# Patient Record
Sex: FEMALE | URBAN - METROPOLITAN AREA
[De-identification: names, ages, dates, MRNs, and addresses within clinical notes are randomized per-mention and may not be internally consistent; named-entity substitution may affect disease eponyms.]

---

## 2020-07-31 ENCOUNTER — APPOINTMENT (OUTPATIENT)
Age: 75
Setting detail: DERMATOLOGY
End: 2020-07-31

## 2020-07-31 DIAGNOSIS — Z85.828 PERSONAL HISTORY OF OTHER MALIGNANT NEOPLASM OF SKIN: ICD-10-CM

## 2020-07-31 PROBLEM — D48.5 NEOPLASM OF UNCERTAIN BEHAVIOR OF SKIN: Status: ACTIVE | Noted: 2020-07-31

## 2020-07-31 PROCEDURE — OTHER REASSURANCE: OTHER

## 2020-07-31 PROCEDURE — 88305 TISSUE EXAM BY PATHOLOGIST: CPT

## 2020-07-31 PROCEDURE — 11102 TANGNTL BX SKIN SINGLE LES: CPT

## 2020-07-31 PROCEDURE — OTHER PATHOLOGY BILLING: OTHER

## 2020-07-31 PROCEDURE — 99213 OFFICE O/P EST LOW 20 MIN: CPT | Mod: 25

## 2020-07-31 PROCEDURE — OTHER BIOPSY BY SHAVE METHOD: OTHER

## 2020-07-31 NOTE — PROCEDURE: BIOPSY BY SHAVE METHOD
Biopsy Type: H and E
Additional Anesthesia Volume In Cc (Will Not Render If 0): 0
Silver Nitrate Text: The wound bed was treated with silver nitrate after the biopsy was performed.
Curettage Text: The wound bed was treated with curettage after the biopsy was performed.
Consent: Written consent was obtained and risks were reviewed including but not limited to scarring, infection, bleeding, scabbing, incomplete removal, nerve damage and allergy to anesthesia.
Billing Type: Client Bill
Electrodesiccation Text: The wound bed was treated with electrodesiccation after the biopsy was performed.
Validate Lesion Size: No
Post-Care Instructions: I reviewed with the patient in detail post-care instructions. Patient is to keep the biopsy site dry overnight, and then apply bacitracin twice daily until healed. Patient may apply hydrogen peroxide soaks to remove any crusting.
Notification Instructions: Patient will be notified of biopsy results. However, patient instructed to call the office if not contacted within 2 weeks.
Cryotherapy Text: The wound bed was treated with cryotherapy after the biopsy was performed.
Hemostasis: Drysol
Type Of Destruction Used: Curettage
Biopsy Method: Dermablade
Wound Care: Petrolatum
Depth Of Biopsy: dermis
Dressing: bandage
Electrodesiccation And Curettage Text: The wound bed was treated with electrodesiccation and curettage after the biopsy was performed.
Detail Level: Detailed
Anesthesia Type: 2% lidocaine with epinephrine and a 1:10 solution of 8.4% sodium bicarbonate
Render Post-Care Instructions In Note?: yes
Information: Selecting Yes will display possible errors in your note based on the variables you have selected. This validation is only offered as a suggestion for you. PLEASE NOTE THAT THE VALIDATION TEXT WILL BE REMOVED WHEN YOU FINALIZE YOUR NOTE. IF YOU WANT TO FAX A PRELIMINARY NOTE YOU WILL NEED TO TOGGLE THIS TO 'NO' IF YOU DO NOT WANT IT IN YOUR FAXED NOTE.
Anesthesia Volume In Cc (Will Not Render If 0): 0.4

## 2020-07-31 NOTE — PROCEDURE: PATHOLOGY BILLING
Immunohistochemistry ([id number] and [id number]) billing is not performed here. Please use the Immunohistochemistry Stain Billing plan to accomplish this. Immunohistochemistry ([id number] and [id number]) billing is not performed here. Please use the Immunohistochemistry Stain Billing plan to accomplish this.

## 2020-09-01 ENCOUNTER — APPOINTMENT (OUTPATIENT)
Dept: URBAN - METROPOLITAN AREA CLINIC 255 | Age: 75
Setting detail: DERMATOLOGY
End: 2020-09-09

## 2020-09-01 PROBLEM — C44.311 BASAL CELL CARCINOMA OF SKIN OF NOSE: Status: ACTIVE | Noted: 2020-09-01

## 2020-09-01 PROCEDURE — 17312 MOHS ADDL STAGE: CPT

## 2020-09-01 PROCEDURE — 17311 MOHS 1 STAGE H/N/HF/G: CPT

## 2020-09-01 PROCEDURE — OTHER MOHS SURGERY: OTHER

## 2020-09-01 PROCEDURE — OTHER MIPS QUALITY: OTHER

## 2020-09-01 PROCEDURE — 14061 TIS TRNFR E/N/E/L10.1-30SQCM: CPT

## 2020-09-01 NOTE — PROCEDURE: MIPS QUALITY
Detail Level: Detailed
Quality 110: Preventive Care And Screening: Influenza Immunization: Influenza Immunization previously received during influenza season
Quality 226: Preventive Care And Screening: Tobacco Use: Screening And Cessation Intervention: Patient screened for tobacco use and is an ex/non-smoker
Quality 143: Oncology: Medical And Radiation- Pain Intensity Quantified: Pain severity quantified, no pain present
Quality 130: Documentation Of Current Medications In The Medical Record: Current Medications Documented
Quality 431: Preventive Care And Screening: Unhealthy Alcohol Use - Screening: Patient screened for unhealthy alcohol use using a single question and scores less than 2 times per year

## 2020-09-01 NOTE — PROCEDURE: MOHS SURGERY
Advancement Flap (Double) Text: In order to preserve normal anatomic and functional relationships, a single advancement flap was deemed the most appropriate reconstructive procedure.  The beveled edges of the Mohs defect were excised with a #15 scalpel blade.    The flaps were designed to fall along relaxed skin tension lines and/or free margins, incised, and elevated using blunt curved tissue scissors.  Hemostasis was achieved.  Interrupted buried vertical mattress sutures were employed to advance the flaps into the primary defect and secure them in place.  Redundant cutaneous columns defined by flap movement were removed to the adipose layer using the triangulation technique and repaired in similar fashion.  Final epidermal approximation along the length of the flap was achieved using epidermal sutures.  The wound was stressed in various directions to ensure the adequacy of the closure and of hemostasis.  The flap edges appeared pink and well perfused.  Reconstruction was considered complete.

## 2020-09-01 NOTE — PROCEDURE: MOHS SURGERY
Body Location Override (Optional - Billing Will Still Be Based On Selected Body Map Location If Applicable): Left nasal Sidewall

## 2020-09-08 ENCOUNTER — APPOINTMENT (OUTPATIENT)
Dept: URBAN - METROPOLITAN AREA CLINIC 255 | Age: 75
Setting detail: DERMATOLOGY
End: 2020-09-09

## 2020-09-08 DIAGNOSIS — Z48.02 ENCOUNTER FOR REMOVAL OF SUTURES: ICD-10-CM

## 2020-09-08 PROCEDURE — OTHER RETURN TO REFERRING PROVIDER: OTHER

## 2020-09-08 PROCEDURE — OTHER COUNSELING: OTHER

## 2020-09-08 PROCEDURE — OTHER SUTURE REMOVAL (GLOBAL PERIOD): OTHER

## 2020-09-08 PROCEDURE — OTHER DIAGNOSIS COMMENT: OTHER

## 2020-09-08 ASSESSMENT — LOCATION DETAILED DESCRIPTION DERM: LOCATION DETAILED: NASAL DORSUM

## 2020-09-08 ASSESSMENT — LOCATION ZONE DERM: LOCATION ZONE: NOSE

## 2020-09-08 ASSESSMENT — LOCATION SIMPLE DESCRIPTION DERM: LOCATION SIMPLE: NOSE

## 2020-09-08 NOTE — PROCEDURE: SUTURE REMOVAL (GLOBAL PERIOD)
Detail Level: Detailed
Add 16492 Cpt? (Important Note: In 2017 The Use Of 47407 Is Being Tracked By Cms To Determine Future Global Period Reimbursement For Global Periods): no

## 2021-04-21 ENCOUNTER — APPOINTMENT (OUTPATIENT)
Dept: URBAN - METROPOLITAN AREA CLINIC 253 | Age: 76
Setting detail: DERMATOLOGY
End: 2021-04-23

## 2021-04-21 VITALS — RESPIRATION RATE: 15 BRPM | WEIGHT: 148 LBS | HEIGHT: 64 IN

## 2021-04-21 DIAGNOSIS — D18.0 HEMANGIOMA: ICD-10-CM

## 2021-04-21 DIAGNOSIS — H00.01 HORDEOLUM EXTERNUM: ICD-10-CM

## 2021-04-21 DIAGNOSIS — L81.4 OTHER MELANIN HYPERPIGMENTATION: ICD-10-CM

## 2021-04-21 DIAGNOSIS — D22 MELANOCYTIC NEVI: ICD-10-CM

## 2021-04-21 DIAGNOSIS — L57.0 ACTINIC KERATOSIS: ICD-10-CM

## 2021-04-21 DIAGNOSIS — Z71.89 OTHER SPECIFIED COUNSELING: ICD-10-CM

## 2021-04-21 DIAGNOSIS — L82.1 OTHER SEBORRHEIC KERATOSIS: ICD-10-CM

## 2021-04-21 DIAGNOSIS — L82.0 INFLAMED SEBORRHEIC KERATOSIS: ICD-10-CM

## 2021-04-21 PROBLEM — D18.01 HEMANGIOMA OF SKIN AND SUBCUTANEOUS TISSUE: Status: ACTIVE | Noted: 2021-04-21

## 2021-04-21 PROBLEM — D22.5 MELANOCYTIC NEVI OF TRUNK: Status: ACTIVE | Noted: 2021-04-21

## 2021-04-21 PROBLEM — H00.012 HORDEOLUM EXTERNUM RIGHT LOWER EYELID: Status: ACTIVE | Noted: 2021-04-21

## 2021-04-21 PROBLEM — D48.5 NEOPLASM OF UNCERTAIN BEHAVIOR OF SKIN: Status: ACTIVE | Noted: 2021-04-21

## 2021-04-21 PROCEDURE — OTHER BIOPSY BY SHAVE METHOD: OTHER

## 2021-04-21 PROCEDURE — OTHER LIQUID NITROGEN: OTHER

## 2021-04-21 PROCEDURE — OTHER COUNSELING: OTHER

## 2021-04-21 PROCEDURE — 99213 OFFICE O/P EST LOW 20 MIN: CPT | Mod: 25

## 2021-04-21 PROCEDURE — 17110 DESTRUCT B9 LESION 1-14: CPT

## 2021-04-21 PROCEDURE — 11102 TANGNTL BX SKIN SINGLE LES: CPT | Mod: 59

## 2021-04-21 PROCEDURE — 17003 DESTRUCT PREMALG LES 2-14: CPT | Mod: 59

## 2021-04-21 PROCEDURE — 17000 DESTRUCT PREMALG LESION: CPT | Mod: 59

## 2021-04-21 PROCEDURE — 11103 TANGNTL BX SKIN EA SEP/ADDL: CPT | Mod: 59

## 2021-04-21 ASSESSMENT — LOCATION SIMPLE DESCRIPTION DERM
LOCATION SIMPLE: RIGHT CHEEK
LOCATION SIMPLE: RIGHT INFERIOR EYELID
LOCATION SIMPLE: LEFT TEMPLE
LOCATION SIMPLE: UPPER BACK

## 2021-04-21 ASSESSMENT — LOCATION ZONE DERM
LOCATION ZONE: TRUNK
LOCATION ZONE: FACE
LOCATION ZONE: EYELID

## 2021-04-21 ASSESSMENT — LOCATION DETAILED DESCRIPTION DERM
LOCATION DETAILED: INFERIOR THORACIC SPINE
LOCATION DETAILED: RIGHT INFERIOR CENTRAL MALAR CHEEK
LOCATION DETAILED: LEFT CENTRAL TEMPLE
LOCATION DETAILED: RIGHT LATERAL INFERIOR EYELID

## 2021-04-21 NOTE — PROCEDURE: LIQUID NITROGEN
Number Of Freeze-Thaw Cycles: 1 freeze-thaw cycle
Detail Level: Detailed
Render Note In Bullet Format When Appropriate: No
Number Of Freeze-Thaw Cycles: 2 freeze-thaw cycles
Post-Care Instructions: I reviewed with the patient in detail post-care instructions. Patient is to wear sunprotection, and avoid picking at any of the treated lesions. Pt may apply Vaseline to crusted or scabbing areas.
Medical Necessity Clause: This procedure was medically necessary because the lesions that were treated were:
Duration Of Freeze Thaw-Cycle (Seconds): 3
Render Post-Care Instructions In Note?: yes
Consent: The patient's consent was obtained including but not limited to risks of crusting, scabbing, blistering, scarring, darker or lighter pigmentary change, recurrence, incomplete removal and infection.
Medical Necessity Information: It is in your best interest to select a reason for this procedure from the list below. All of these items fulfill various CMS LCD requirements except the new and changing color options.

## 2021-04-21 NOTE — HPI: FULL BODY SKIN EXAMINATION
How Severe Are Your Spot(S)?: moderate
What Is The Reason For Today's Visit?: Full Body Skin Examination
What Is The Reason For Today's Visit? (Being Monitored For X): the re-examination of lesions previously examined
Additional History: The spot on her right shin is pink and not changing.  There are a few scaly spots on her right cheek as well.

## 2021-04-21 NOTE — PROCEDURE: BIOPSY BY SHAVE METHOD
Bill 18383 For Specimen Handling/Conveyance To Laboratory?: no
X Size Of Lesion In Cm: 0
Billing Type: Third-Party Bill
Detail Level: Detailed
Cryotherapy Text: The wound bed was treated with cryotherapy after the biopsy was performed.
Type Of Destruction Used: Curettage
Biopsy Type: H and E
Information: Selecting Yes will display possible errors in your note based on the variables you have selected. This validation is only offered as a suggestion for you. PLEASE NOTE THAT THE VALIDATION TEXT WILL BE REMOVED WHEN YOU FINALIZE YOUR NOTE. IF YOU WANT TO FAX A PRELIMINARY NOTE YOU WILL NEED TO TOGGLE THIS TO 'NO' IF YOU DO NOT WANT IT IN YOUR FAXED NOTE.
Post-Care Instructions: I reviewed with the patient in detail post-care instructions. Patient is to keep the biopsy site dry overnight, and then apply bacitracin twice daily until healed. Patient may apply hydrogen peroxide soaks to remove any crusting.
Curettage Text: The wound bed was treated with curettage after the biopsy was performed.
Dressing: bandage
Depth Of Biopsy: dermis
Consent: Written consent was obtained and risks were reviewed including but not limited to scarring, infection, bleeding, scabbing, incomplete removal, nerve damage and allergy to anesthesia.
Anesthesia Type: 2% lidocaine with epinephrine and a 1:10 solution of 8.4% sodium bicarbonate
Electrodesiccation Text: The wound bed was treated with electrodesiccation after the biopsy was performed.
Biopsy Method: Dermablade
Wound Care: Petrolatum
Was A Bandage Applied: Yes
Anesthesia Volume In Cc (Will Not Render If 0): 0.3
Notification Instructions: Patient will be notified of biopsy results. However, patient instructed to call the office if not contacted within 2 weeks.
Electrodesiccation And Curettage Text: The wound bed was treated with electrodesiccation and curettage after the biopsy was performed.
Silver Nitrate Text: The wound bed was treated with silver nitrate after the biopsy was performed.
Hemostasis: Drysol

## 2021-10-25 ENCOUNTER — APPOINTMENT (OUTPATIENT)
Dept: URBAN - METROPOLITAN AREA CLINIC 253 | Age: 76
Setting detail: DERMATOLOGY
End: 2021-10-29

## 2021-10-25 VITALS — HEIGHT: 64 IN | WEIGHT: 140 LBS | RESPIRATION RATE: 14 BRPM

## 2021-10-25 DIAGNOSIS — Z85.828 PERSONAL HISTORY OF OTHER MALIGNANT NEOPLASM OF SKIN: ICD-10-CM

## 2021-10-25 DIAGNOSIS — L57.0 ACTINIC KERATOSIS: ICD-10-CM

## 2021-10-25 DIAGNOSIS — D22 MELANOCYTIC NEVI: ICD-10-CM

## 2021-10-25 DIAGNOSIS — Z71.89 OTHER SPECIFIED COUNSELING: ICD-10-CM

## 2021-10-25 DIAGNOSIS — D18.0 HEMANGIOMA: ICD-10-CM

## 2021-10-25 DIAGNOSIS — L81.4 OTHER MELANIN HYPERPIGMENTATION: ICD-10-CM

## 2021-10-25 DIAGNOSIS — L82.1 OTHER SEBORRHEIC KERATOSIS: ICD-10-CM

## 2021-10-25 PROBLEM — D48.5 NEOPLASM OF UNCERTAIN BEHAVIOR OF SKIN: Status: ACTIVE | Noted: 2021-10-25

## 2021-10-25 PROBLEM — D18.01 HEMANGIOMA OF SKIN AND SUBCUTANEOUS TISSUE: Status: ACTIVE | Noted: 2021-10-25

## 2021-10-25 PROBLEM — D22.5 MELANOCYTIC NEVI OF TRUNK: Status: ACTIVE | Noted: 2021-10-25

## 2021-10-25 PROCEDURE — 99213 OFFICE O/P EST LOW 20 MIN: CPT | Mod: 25

## 2021-10-25 PROCEDURE — OTHER BIOPSY BY SHAVE METHOD: OTHER

## 2021-10-25 PROCEDURE — 17000 DESTRUCT PREMALG LESION: CPT | Mod: 59

## 2021-10-25 PROCEDURE — 11102 TANGNTL BX SKIN SINGLE LES: CPT

## 2021-10-25 PROCEDURE — OTHER COUNSELING: OTHER

## 2021-10-25 PROCEDURE — OTHER LIQUID NITROGEN: OTHER

## 2021-10-25 PROCEDURE — 11103 TANGNTL BX SKIN EA SEP/ADDL: CPT

## 2021-10-25 PROCEDURE — 17003 DESTRUCT PREMALG LES 2-14: CPT

## 2021-10-25 ASSESSMENT — LOCATION SIMPLE DESCRIPTION DERM
LOCATION SIMPLE: POSTERIOR NECK
LOCATION SIMPLE: CHEST
LOCATION SIMPLE: RIGHT PRETIBIAL REGION
LOCATION SIMPLE: LEFT PRETIBIAL REGION
LOCATION SIMPLE: UPPER BACK
LOCATION SIMPLE: LEFT NOSE

## 2021-10-25 ASSESSMENT — LOCATION ZONE DERM
LOCATION ZONE: TRUNK
LOCATION ZONE: LEG
LOCATION ZONE: NECK
LOCATION ZONE: NOSE

## 2021-10-25 ASSESSMENT — LOCATION DETAILED DESCRIPTION DERM
LOCATION DETAILED: LEFT DISTAL PRETIBIAL REGION
LOCATION DETAILED: RIGHT DISTAL PRETIBIAL REGION
LOCATION DETAILED: MIDDLE STERNUM
LOCATION DETAILED: INFERIOR THORACIC SPINE
LOCATION DETAILED: LEFT LATERAL TRAPEZIAL NECK
LOCATION DETAILED: LEFT NASAL SIDEWALL

## 2021-10-25 NOTE — PROCEDURE: BIOPSY BY SHAVE METHOD
Hide Topical Anesthesia?: No
Type Of Destruction Used: Curettage
Size Of Lesion In Cm: 0
Hemostasis: Drysol
Cryotherapy Text: The wound bed was treated with cryotherapy after the biopsy was performed.
Post-Care Instructions: I reviewed with the patient in detail post-care instructions. Patient is to keep the biopsy site dry overnight, and then apply bacitracin twice daily until healed. Patient may apply hydrogen peroxide soaks to remove any crusting.
Biopsy Type: H and E
Electrodesiccation Text: The wound bed was treated with electrodesiccation after the biopsy was performed.
Render Post-Care Instructions In Note?: yes
Electrodesiccation And Curettage Text: The wound bed was treated with electrodesiccation and curettage after the biopsy was performed.
Notification Instructions: Patient will be notified of biopsy results. However, patient instructed to call the office if not contacted within 2 weeks.
Anesthesia Volume In Cc (Will Not Render If 0): 0.3
Information: Selecting Yes will display possible errors in your note based on the variables you have selected. This validation is only offered as a suggestion for you. PLEASE NOTE THAT THE VALIDATION TEXT WILL BE REMOVED WHEN YOU FINALIZE YOUR NOTE. IF YOU WANT TO FAX A PRELIMINARY NOTE YOU WILL NEED TO TOGGLE THIS TO 'NO' IF YOU DO NOT WANT IT IN YOUR FAXED NOTE.
Depth Of Biopsy: dermis
Anesthesia Type: 2% lidocaine with epinephrine and a 1:10 solution of 8.4% sodium bicarbonate
Biopsy Method: Dermablade
Detail Level: Detailed
Curettage Text: The wound bed was treated with curettage after the biopsy was performed.
Dressing: bandage
Billing Type: Third-Party Bill
Wound Care: Petrolatum
Consent: Written consent was obtained and risks were reviewed including but not limited to scarring, infection, bleeding, scabbing, incomplete removal, nerve damage and allergy to anesthesia.
Silver Nitrate Text: The wound bed was treated with silver nitrate after the biopsy was performed.

## 2021-10-25 NOTE — PROCEDURE: LIQUID NITROGEN
Detail Level: Detailed
Render Note In Bullet Format When Appropriate: No
Render Post-Care Instructions In Note?: yes
Consent: The patient's consent was obtained including but not limited to risks of crusting, scabbing, blistering, scarring, darker or lighter pigmentary change, recurrence, incomplete removal and infection.
Post-Care Instructions: I reviewed with the patient in detail post-care instructions. Patient is to wear sunprotection, and avoid picking at any of the treated lesions. Pt may apply Vaseline to crusted or scabbing areas.
Number Of Freeze-Thaw Cycles: 2 freeze-thaw cycles
Duration Of Freeze Thaw-Cycle (Seconds): 3

## 2021-11-22 ENCOUNTER — APPOINTMENT (OUTPATIENT)
Dept: URBAN - METROPOLITAN AREA CLINIC 253 | Age: 76
Setting detail: DERMATOLOGY
End: 2021-11-24

## 2021-11-22 VITALS — HEIGHT: 64 IN | WEIGHT: 140 LBS | RESPIRATION RATE: 14 BRPM

## 2021-11-22 VITALS — WEIGHT: 140 LBS | HEIGHT: 64 IN

## 2021-11-22 DIAGNOSIS — D22 MELANOCYTIC NEVI: ICD-10-CM

## 2021-11-22 DIAGNOSIS — L57.0 ACTINIC KERATOSIS: ICD-10-CM

## 2021-11-22 PROBLEM — D22.61 MELANOCYTIC NEVI OF RIGHT UPPER LIMB, INCLUDING SHOULDER: Status: ACTIVE | Noted: 2021-11-22

## 2021-11-22 PROCEDURE — OTHER EXCISION: OTHER

## 2021-11-22 PROCEDURE — 11402 EXC TR-EXT B9+MARG 1.1-2 CM: CPT

## 2021-11-22 PROCEDURE — 12031 INTMD RPR S/A/T/EXT 2.5 CM/<: CPT

## 2021-11-22 PROCEDURE — OTHER MIPS QUALITY: OTHER

## 2021-11-22 PROCEDURE — OTHER COUNSELING: OTHER

## 2021-11-22 ASSESSMENT — LOCATION SIMPLE DESCRIPTION DERM
LOCATION SIMPLE: RIGHT LIP
LOCATION SIMPLE: RIGHT FOREARM

## 2021-11-22 ASSESSMENT — LOCATION ZONE DERM
LOCATION ZONE: ARM
LOCATION ZONE: LIP

## 2021-11-22 ASSESSMENT — LOCATION DETAILED DESCRIPTION DERM
LOCATION DETAILED: RIGHT PROXIMAL DORSAL FOREARM
LOCATION DETAILED: RIGHT UPPER CUTANEOUS LIP

## 2021-11-22 NOTE — PROCEDURE: COUNSELING
Patient Specific Counseling (Will Not Stick From Patient To Patient): Informed her that it appears resolved on exam. Continue to monitor.
Detail Level: Simple

## 2021-12-06 ENCOUNTER — APPOINTMENT (OUTPATIENT)
Dept: URBAN - METROPOLITAN AREA CLINIC 253 | Age: 76
Setting detail: DERMATOLOGY
End: 2021-12-09

## 2021-12-06 DIAGNOSIS — L57.0 ACTINIC KERATOSIS: ICD-10-CM

## 2021-12-06 DIAGNOSIS — Z48.02 ENCOUNTER FOR REMOVAL OF SUTURES: ICD-10-CM

## 2021-12-06 PROBLEM — D04.71 CARCINOMA IN SITU OF SKIN OF RIGHT LOWER LIMB, INCLUDING HIP: Status: ACTIVE | Noted: 2021-12-06

## 2021-12-06 PROCEDURE — 99213 OFFICE O/P EST LOW 20 MIN: CPT

## 2021-12-06 PROCEDURE — OTHER ADDITIONAL NOTES: OTHER

## 2021-12-06 PROCEDURE — OTHER COUNSELING: OTHER

## 2021-12-06 PROCEDURE — OTHER SUTURE REMOVAL (GLOBAL PERIOD): OTHER

## 2021-12-06 PROCEDURE — OTHER PRESCRIPTION: OTHER

## 2021-12-06 RX ORDER — FLUOROURACIL 2 G/40G
5% CREAM TOPICAL BID
Qty: 40 | Refills: 0 | Status: ERX | COMMUNITY
Start: 2021-12-06

## 2021-12-06 ASSESSMENT — LOCATION DETAILED DESCRIPTION DERM
LOCATION DETAILED: LEFT SUPERIOR VERMILION LIP
LOCATION DETAILED: RIGHT PROXIMAL RADIAL DORSAL FOREARM

## 2021-12-06 ASSESSMENT — LOCATION SIMPLE DESCRIPTION DERM
LOCATION SIMPLE: LEFT LIP
LOCATION SIMPLE: RIGHT FOREARM

## 2021-12-06 ASSESSMENT — LOCATION ZONE DERM
LOCATION ZONE: LIP
LOCATION ZONE: ARM

## 2021-12-06 NOTE — PROCEDURE: ADDITIONAL NOTES
Render Risk Assessment In Note?: no
Detail Level: Zone
Additional Notes: No recurrence. Will continue to monitor at skin exams

## 2021-12-06 NOTE — PROCEDURE: SUTURE REMOVAL (GLOBAL PERIOD)
Detail Level: Detailed
Add 01600 Cpt? (Important Note: In 2017 The Use Of 04485 Is Being Tracked By Cms To Determine Future Global Period Reimbursement For Global Periods): no

## 2022-04-17 ENCOUNTER — APPOINTMENT (OUTPATIENT)
Dept: MRI IMAGING | Facility: CLINIC | Age: 77
DRG: 066 | End: 2022-04-17
Attending: EMERGENCY MEDICINE
Payer: COMMERCIAL

## 2022-04-17 ENCOUNTER — APPOINTMENT (OUTPATIENT)
Dept: CT IMAGING | Facility: CLINIC | Age: 77
DRG: 066 | End: 2022-04-17
Attending: EMERGENCY MEDICINE
Payer: COMMERCIAL

## 2022-04-17 ENCOUNTER — HOSPITAL ENCOUNTER (INPATIENT)
Facility: CLINIC | Age: 77
LOS: 2 days | Discharge: HOME | DRG: 066 | End: 2022-04-19
Attending: EMERGENCY MEDICINE | Admitting: HOSPITALIST
Payer: COMMERCIAL

## 2022-04-17 DIAGNOSIS — I63.49 CEREBROVASCULAR ACCIDENT (CVA) DUE TO EMBOLISM OF OTHER CEREBRAL ARTERY (H): ICD-10-CM

## 2022-04-17 LAB
ABO/RH(D): NORMAL
ALBUMIN SERPL-MCNC: 4.1 G/DL (ref 3.4–5)
ALP SERPL-CCNC: 84 U/L (ref 40–150)
ALT SERPL W P-5'-P-CCNC: 36 U/L (ref 0–50)
ANION GAP SERPL CALCULATED.3IONS-SCNC: 3 MMOL/L (ref 3–14)
ANTIBODY SCREEN: NEGATIVE
APTT PPP: 28 SECONDS (ref 22–38)
AST SERPL W P-5'-P-CCNC: 25 U/L (ref 0–45)
ATRIAL RATE - MUSE: 83 BPM
BASOPHILS # BLD AUTO: 0 10E3/UL (ref 0–0.2)
BASOPHILS NFR BLD AUTO: 1 %
BILIRUB DIRECT SERPL-MCNC: <0.1 MG/DL (ref 0–0.2)
BILIRUB SERPL-MCNC: 0.2 MG/DL (ref 0.2–1.3)
BUN SERPL-MCNC: 15 MG/DL (ref 7–30)
CALCIUM SERPL-MCNC: 9.3 MG/DL (ref 8.5–10.1)
CHLORIDE BLD-SCNC: 112 MMOL/L (ref 94–109)
CHOLEST SERPL-MCNC: 243 MG/DL
CO2 SERPL-SCNC: 26 MMOL/L (ref 20–32)
CREAT SERPL-MCNC: 0.7 MG/DL (ref 0.52–1.04)
DIASTOLIC BLOOD PRESSURE - MUSE: NORMAL MMHG
EOSINOPHIL # BLD AUTO: 0.2 10E3/UL (ref 0–0.7)
EOSINOPHIL NFR BLD AUTO: 2 %
ERYTHROCYTE [DISTWIDTH] IN BLOOD BY AUTOMATED COUNT: 12.3 % (ref 10–15)
GFR SERPL CREATININE-BSD FRML MDRD: 89 ML/MIN/1.73M2
GLUCOSE BLD-MCNC: 119 MG/DL (ref 70–99)
HBA1C MFR BLD: 5.6 % (ref 0–5.6)
HCT VFR BLD AUTO: 41.7 % (ref 35–47)
HDLC SERPL-MCNC: 55 MG/DL
HGB BLD-MCNC: 13.6 G/DL (ref 11.7–15.7)
IMM GRANULOCYTES # BLD: 0 10E3/UL
IMM GRANULOCYTES NFR BLD: 0 %
INR PPP: 0.96 (ref 0.85–1.15)
INTERPRETATION ECG - MUSE: NORMAL
LDLC SERPL CALC-MCNC: 147 MG/DL
LYMPHOCYTES # BLD AUTO: 1.8 10E3/UL (ref 0.8–5.3)
LYMPHOCYTES NFR BLD AUTO: 27 %
MCH RBC QN AUTO: 30 PG (ref 26.5–33)
MCHC RBC AUTO-ENTMCNC: 32.6 G/DL (ref 31.5–36.5)
MCV RBC AUTO: 92 FL (ref 78–100)
MONOCYTES # BLD AUTO: 0.6 10E3/UL (ref 0–1.3)
MONOCYTES NFR BLD AUTO: 8 %
NEUTROPHILS # BLD AUTO: 4.3 10E3/UL (ref 1.6–8.3)
NEUTROPHILS NFR BLD AUTO: 62 %
NONHDLC SERPL-MCNC: 188 MG/DL
NRBC # BLD AUTO: 0 10E3/UL
NRBC BLD AUTO-RTO: 0 /100
P AXIS - MUSE: 43 DEGREES
PLATELET # BLD AUTO: 191 10E3/UL (ref 150–450)
POTASSIUM BLD-SCNC: 3.8 MMOL/L (ref 3.4–5.3)
PR INTERVAL - MUSE: 140 MS
PROT SERPL-MCNC: 7.5 G/DL (ref 6.8–8.8)
QRS DURATION - MUSE: 96 MS
QT - MUSE: 386 MS
QTC - MUSE: 453 MS
R AXIS - MUSE: 54 DEGREES
RBC # BLD AUTO: 4.53 10E6/UL (ref 3.8–5.2)
SARS-COV-2 RNA RESP QL NAA+PROBE: NEGATIVE
SODIUM SERPL-SCNC: 141 MMOL/L (ref 133–144)
SPECIMEN EXPIRATION DATE: NORMAL
SYSTOLIC BLOOD PRESSURE - MUSE: NORMAL MMHG
T AXIS - MUSE: 59 DEGREES
TRIGL SERPL-MCNC: 207 MG/DL
TROPONIN I SERPL HS-MCNC: <3 NG/L
VENTRICULAR RATE- MUSE: 83 BPM
WBC # BLD AUTO: 7 10E3/UL (ref 4–11)

## 2022-04-17 PROCEDURE — 250N000009 HC RX 250: Performed by: EMERGENCY MEDICINE

## 2022-04-17 PROCEDURE — 255N000002 HC RX 255 OP 636: Performed by: HOSPITALIST

## 2022-04-17 PROCEDURE — 80076 HEPATIC FUNCTION PANEL: CPT | Performed by: PHYSICIAN ASSISTANT

## 2022-04-17 PROCEDURE — 85025 COMPLETE CBC W/AUTO DIFF WBC: CPT | Performed by: EMERGENCY MEDICINE

## 2022-04-17 PROCEDURE — 96374 THER/PROPH/DIAG INJ IV PUSH: CPT | Mod: 59

## 2022-04-17 PROCEDURE — 250N000011 HC RX IP 250 OP 636: Performed by: EMERGENCY MEDICINE

## 2022-04-17 PROCEDURE — A9585 GADOBUTROL INJECTION: HCPCS | Performed by: HOSPITALIST

## 2022-04-17 PROCEDURE — 70553 MRI BRAIN STEM W/O & W/DYE: CPT

## 2022-04-17 PROCEDURE — 85730 THROMBOPLASTIN TIME PARTIAL: CPT | Performed by: EMERGENCY MEDICINE

## 2022-04-17 PROCEDURE — 93005 ELECTROCARDIOGRAM TRACING: CPT

## 2022-04-17 PROCEDURE — 70544 MR ANGIOGRAPHY HEAD W/O DYE: CPT

## 2022-04-17 PROCEDURE — 83036 HEMOGLOBIN GLYCOSYLATED A1C: CPT | Performed by: PHYSICIAN ASSISTANT

## 2022-04-17 PROCEDURE — 70549 MR ANGIOGRAPH NECK W/O&W/DYE: CPT

## 2022-04-17 PROCEDURE — 36415 COLL VENOUS BLD VENIPUNCTURE: CPT | Performed by: EMERGENCY MEDICINE

## 2022-04-17 PROCEDURE — 250N000013 HC RX MED GY IP 250 OP 250 PS 637: Performed by: EMERGENCY MEDICINE

## 2022-04-17 PROCEDURE — G1004 CDSM NDSC: HCPCS

## 2022-04-17 PROCEDURE — 99285 EMERGENCY DEPT VISIT HI MDM: CPT | Mod: 25

## 2022-04-17 PROCEDURE — 86901 BLOOD TYPING SEROLOGIC RH(D): CPT | Performed by: EMERGENCY MEDICINE

## 2022-04-17 PROCEDURE — C9803 HOPD COVID-19 SPEC COLLECT: HCPCS

## 2022-04-17 PROCEDURE — 120N000001 HC R&B MED SURG/OB

## 2022-04-17 PROCEDURE — 70496 CT ANGIOGRAPHY HEAD: CPT | Mod: ME

## 2022-04-17 PROCEDURE — 84484 ASSAY OF TROPONIN QUANT: CPT | Performed by: EMERGENCY MEDICINE

## 2022-04-17 PROCEDURE — U0005 INFEC AGEN DETEC AMPLI PROBE: HCPCS | Performed by: EMERGENCY MEDICINE

## 2022-04-17 PROCEDURE — 99223 1ST HOSP IP/OBS HIGH 75: CPT | Mod: AI | Performed by: PHYSICIAN ASSISTANT

## 2022-04-17 PROCEDURE — 85610 PROTHROMBIN TIME: CPT | Performed by: EMERGENCY MEDICINE

## 2022-04-17 PROCEDURE — 80061 LIPID PANEL: CPT | Performed by: PHYSICIAN ASSISTANT

## 2022-04-17 PROCEDURE — 99207 PR NO CHARGE LOS: CPT | Performed by: STUDENT IN AN ORGANIZED HEALTH CARE EDUCATION/TRAINING PROGRAM

## 2022-04-17 PROCEDURE — 80048 BASIC METABOLIC PNL TOTAL CA: CPT | Performed by: EMERGENCY MEDICINE

## 2022-04-17 RX ORDER — PROCHLORPERAZINE 25 MG
12.5 SUPPOSITORY, RECTAL RECTAL EVERY 12 HOURS PRN
Status: DISCONTINUED | OUTPATIENT
Start: 2022-04-17 | End: 2022-04-19 | Stop reason: HOSPADM

## 2022-04-17 RX ORDER — ASPIRIN 325 MG
325 TABLET ORAL ONCE
Status: COMPLETED | OUTPATIENT
Start: 2022-04-17 | End: 2022-04-17

## 2022-04-17 RX ORDER — LATANOPROST 50 UG/ML
1 SOLUTION/ DROPS OPHTHALMIC DAILY
COMMUNITY

## 2022-04-17 RX ORDER — ASPIRIN 81 MG/1
81 TABLET ORAL DAILY
Status: DISCONTINUED | OUTPATIENT
Start: 2022-04-18 | End: 2022-04-19 | Stop reason: HOSPADM

## 2022-04-17 RX ORDER — GADOBUTROL 604.72 MG/ML
10 INJECTION INTRAVENOUS ONCE
Status: COMPLETED | OUTPATIENT
Start: 2022-04-17 | End: 2022-04-17

## 2022-04-17 RX ORDER — ACETAMINOPHEN 325 MG/1
650 TABLET ORAL EVERY 4 HOURS PRN
Status: DISCONTINUED | OUTPATIENT
Start: 2022-04-17 | End: 2022-04-19 | Stop reason: HOSPADM

## 2022-04-17 RX ORDER — ONDANSETRON 2 MG/ML
4 INJECTION INTRAMUSCULAR; INTRAVENOUS EVERY 6 HOURS PRN
Status: DISCONTINUED | OUTPATIENT
Start: 2022-04-17 | End: 2022-04-19 | Stop reason: HOSPADM

## 2022-04-17 RX ORDER — SODIUM CHLORIDE 9 MG/ML
INJECTION, SOLUTION INTRAVENOUS CONTINUOUS PRN
Status: DISCONTINUED | OUTPATIENT
Start: 2022-04-17 | End: 2022-04-19 | Stop reason: HOSPADM

## 2022-04-17 RX ORDER — LORAZEPAM 2 MG/ML
0.5 INJECTION INTRAMUSCULAR ONCE
Status: COMPLETED | OUTPATIENT
Start: 2022-04-17 | End: 2022-04-17

## 2022-04-17 RX ORDER — LATANOPROST 50 UG/ML
1 SOLUTION/ DROPS OPHTHALMIC EVERY EVENING
Status: DISCONTINUED | OUTPATIENT
Start: 2022-04-18 | End: 2022-04-19 | Stop reason: HOSPADM

## 2022-04-17 RX ORDER — IOPAMIDOL 755 MG/ML
70 INJECTION, SOLUTION INTRAVASCULAR ONCE
Status: COMPLETED | OUTPATIENT
Start: 2022-04-17 | End: 2022-04-17

## 2022-04-17 RX ORDER — LABETALOL HYDROCHLORIDE 5 MG/ML
10-20 INJECTION, SOLUTION INTRAVENOUS EVERY 10 MIN PRN
Status: DISCONTINUED | OUTPATIENT
Start: 2022-04-17 | End: 2022-04-19 | Stop reason: HOSPADM

## 2022-04-17 RX ORDER — ASPIRIN 81 MG/1
81 TABLET, CHEWABLE ORAL DAILY
Status: DISCONTINUED | OUTPATIENT
Start: 2022-04-18 | End: 2022-04-19 | Stop reason: HOSPADM

## 2022-04-17 RX ORDER — DIPHENHYDRAMINE HCL, IBUPROFEN 25; 200 MG/1; MG/1
2 CAPSULE, LIQUID FILLED ORAL
COMMUNITY

## 2022-04-17 RX ORDER — ONDANSETRON 4 MG/1
4 TABLET, ORALLY DISINTEGRATING ORAL EVERY 6 HOURS PRN
Status: DISCONTINUED | OUTPATIENT
Start: 2022-04-17 | End: 2022-04-19 | Stop reason: HOSPADM

## 2022-04-17 RX ORDER — HYDRALAZINE HYDROCHLORIDE 20 MG/ML
10-20 INJECTION INTRAMUSCULAR; INTRAVENOUS
Status: DISCONTINUED | OUTPATIENT
Start: 2022-04-17 | End: 2022-04-19 | Stop reason: HOSPADM

## 2022-04-17 RX ORDER — PROCHLORPERAZINE MALEATE 5 MG
5 TABLET ORAL EVERY 6 HOURS PRN
Status: DISCONTINUED | OUTPATIENT
Start: 2022-04-17 | End: 2022-04-19 | Stop reason: HOSPADM

## 2022-04-17 RX ORDER — LIDOCAINE 40 MG/G
CREAM TOPICAL
Status: DISCONTINUED | OUTPATIENT
Start: 2022-04-17 | End: 2022-04-19 | Stop reason: HOSPADM

## 2022-04-17 RX ADMIN — LORAZEPAM 0.5 MG: 2 INJECTION INTRAMUSCULAR; INTRAVENOUS at 19:05

## 2022-04-17 RX ADMIN — ASPIRIN 325 MG ORAL TABLET 325 MG: 325 PILL ORAL at 21:11

## 2022-04-17 RX ADMIN — GADOBUTROL 10 ML: 604.72 INJECTION INTRAVENOUS at 20:02

## 2022-04-17 RX ADMIN — SODIUM CHLORIDE 80 ML: 900 INJECTION INTRAVENOUS at 16:34

## 2022-04-17 RX ADMIN — IOPAMIDOL 70 ML: 755 INJECTION, SOLUTION INTRAVENOUS at 16:34

## 2022-04-17 ASSESSMENT — ACTIVITIES OF DAILY LIVING (ADL)
ADLS_ACUITY_SCORE: 8
DOING_ERRANDS_INDEPENDENTLY_DIFFICULTY: NO
CHANGE_IN_FUNCTIONAL_STATUS_SINCE_ONSET_OF_CURRENT_ILLNESS/INJURY: NO
TOILETING_ISSUES: NO
ADLS_ACUITY_SCORE: 8
DRESSING/BATHING_DIFFICULTY: NO
ADLS_ACUITY_SCORE: 8

## 2022-04-17 NOTE — ED NOTES
"[facility]  ED Nurse Handoff Report    ED Chief complaint: Altered Mental Status      ED Diagnosis:   Final diagnoses:   None       Code Status: Full Code    Allergies:   Allergies   Allergen Reactions     Codeine Sulfate        Patient Story: altered mental status  Focused Assessment:  Patient had balance issues last 3 days with getting tipsy and tripping over more with confusions.     Treatments and/or interventions provided: no intervention at this time  Patient's response to treatments and/or interventions: na    To be done/followed up on inpatient unit:  close monitoring    Does this patient have any cognitive concerns?: na    Activity level - Baseline/Home:  Independent  Activity Level - Current:   Stand with Assist    Patient's Preferred language: English   Needed?: No    Isolation: None  Infection: Not Applicable  Patient tested for COVID 19 prior to admission: YES  Bariatric?: No    Vital Signs:   Vitals:    04/17/22 1551   BP: (!) 159/99   Pulse: 90   Resp: 18   Temp: 97.3  F (36.3  C)   TempSrc: Temporal   SpO2: 100%   Weight: 68 kg (150 lb)   Height: 1.626 m (5' 4\")       Cardiac Rhythm:     Was the PSS-3 completed:   Yes  What interventions are required if any?               Family Comments:  at bedside  OBS brochure/video discussed/provided to patient/family: Yes              Name of person given brochure if not patient: na              Relationship to patient: na    For the majority of the shift this patient's behavior was Green.   Behavioral interventions performed were none.    ED NURSE PHONE NUMBER: *[id number]         "

## 2022-04-17 NOTE — ED PROVIDER NOTES
History     Chief Complaint:    Altered Mental Status      HPI   Lamont Olsen is a 77 year old female who presents with falling to the left side.    Patient is a 77-year-old female who has high cholesterol but no other medical problems.  Patient recently visited her family in [place] and she was doing fine.  [facility] and noted that she is not herself.  Family is noticed kind of quite a quieter or change in speech.  She also has been falling and leaning to the left.  Patient did fall and hit her shoulder but denies recent head injury.  She has felt unsteady on her gait and bumped into the doorway on the left side.  Denies double vision or headache.  Family felt she needed to be seen as she has had a friend that presented similarly and had a stroke.    Review of Systems  No headache no head injury no vomiting no double vision all the systems negative except as above    Allergies:    Codeine Sulfate      Medications:      aspirin 81 MG chewable tablet  ATORVASTATIN CALCIUM PO  Cholecalciferol (VITAMIN D3 PO)  NAPROXEN PO        Past Medical History:      Past Medical History:   Diagnosis Date     Hyperlipidemia      Malignant neoplasm (H) age 45     Osteoarthritis      Patient Active Problem List    Diagnosis Date Noted     Cervicalgia 11/17/2015     Priority: Medium     Nonallopathic lesion of cervical region 10/26/2015     Priority: Medium     Intractable episodic tension-type headache 10/26/2015     Priority: Medium     Nonallopathic lesion of thoracic region 10/26/2015     Priority: Medium        Past Surgical History:      Past Surgical History:   Procedure Laterality Date     APPENDECTOMY  age 5     LUMPECTOMY BREAST       ORTHOPEDIC SURGERY  age 59    low back     ZZHC INCISION TENDON SHEATH FINGER      right thumb and 4th finger       Family History:      Family History   Problem Relation Age of Onset     C.A.D. Father      Cancer Maternal Grandmother         bone cancer      "Breast Cancer Paternal Grandmother        Social History:  Here with  referred to the emergency by family recently visited by family from [place].    Physical Exam     Patient Vitals for the past 24 hrs:   BP Temp Temp src Pulse Resp SpO2 Height Weight   04/17/22 1551 (!) 159/99 97.3  F (36.3  C) Temporal 90 18 100 % 1.626 m (5' 4\") 68 kg (150 lb)       Physical Exam  Vitals reviewed.   HENT:      Head: Normocephalic.      Mouth/Throat:      Mouth: Mucous membranes are moist.   Eyes:      Extraocular Movements: Extraocular movements intact.   Cardiovascular:      Rate and Rhythm: Normal rate and regular rhythm.      Heart sounds: Normal heart sounds.   Pulmonary:      Effort: Pulmonary effort is normal.   Abdominal:      Palpations: Abdomen is soft.   Musculoskeletal:         General: Normal range of motion.      Cervical back: Normal range of motion.   Skin:     General: Skin is warm.      Capillary Refill: Capillary refill takes less than 2 seconds.   Neurological:      Mental Status: She is alert and oriented to person, place, and time.      GCS: GCS eye subscore is 4. GCS verbal subscore is 5. GCS motor subscore is 6.      Cranial Nerves: No cranial nerve deficit.      Sensory: No sensory deficit.      Deep Tendon Reflexes: Reflexes normal.   Psychiatric:         Mood and Affect: Mood normal.         Speech: Speech normal.       [employer] Stroke Scale  Exam Interval: Baseline   Score    Level of consciousness: (0)   Alert, keenly responsive    LOC questions: (0)   Answers both questions correctly    LOC commands: (0)   Performs both tasks correctly    Best gaze: (0)   Normal    Visual: (0)   No visual loss    Facial palsy: (0)   Normal symmetrical movements    Motor arm (left): (0)   No drift    Motor arm (right): (0)   No drift    Motor leg (left): (0)   No drift    Motor leg (right): (0)   No drift    Limb ataxia: (0)   Absent    Sensory: (0)   Normal- no sensory loss    Best " language: (0)   Normal- no aphasia    Dysarthria: (0)   Normal    Extinction and inattention: (0)   No abnormality        Total Score:  0     Emergency Department Course   ECG:  ECG taken at 1648, ECG read at 1700    Rate 83 bpm. DE interval 140 ms. QRS duration 96 ms. QT/QTc 386/453 ms. nORML SINUS RYTHMN      Imaging:  .  Laboratory:  Labs Ordered and Resulted from Time of ED Arrival to Time of ED Departure   BASIC METABOLIC PANEL - Abnormal       Result Value    Sodium 141      Potassium 3.8      Chloride 112 (*)     Carbon Dioxide (CO2) 26      Anion Gap 3      Urea Nitrogen 15      Creatinine 0.70      Calcium 9.3      Glucose 119 (*)     GFR Estimate 89     TROPONIN I - Normal    Troponin I High Sensitivity <3     INR - Normal    INR 0.96     PARTIAL THROMBOPLASTIN TIME - Normal    aPTT 28     COVID-19 VIRUS (CORONAVIRUS) BY PCR - Normal    SARS CoV2 PCR Negative     CBC WITH PLATELETS AND DIFFERENTIAL    WBC Count 7.0      RBC Count 4.53      Hemoglobin 13.6      Hematocrit 41.7      MCV 92      MCH 30.0      MCHC 32.6      RDW 12.3      Platelet Count 191      % Neutrophils 62      % Lymphocytes 27      % Monocytes 8      % Eosinophils 2      % Basophils 1      % Immature Granulocytes 0      NRBCs per 100 WBC 0      Absolute Neutrophils 4.3      Absolute Lymphocytes 1.8      Absolute Monocytes 0.6      Absolute Eosinophils 0.2      Absolute Basophils 0.0      Absolute Immature Granulocytes 0.0      Absolute NRBCs 0.0     TYPE AND SCREEN, ADULT    ABO/RH(D) A NEG      Antibody Screen Negative      SPECIMEN EXPIRATION DATE [id number]     ABO/RH TYPE AND SCREEN           Emergency Department Course:           Reviewed:    I reviewed nursing notes and vitals    Assessments:  1555 I obtained history and examined the patient as noted above.   1700 I rechecked the patient and explained findings.       Consults:   STROKE NEUROLOGY  DR EDWARD HOSPITALIST           Interventions:    Medications   iopamidol  (ISOVUE-370) solution 70 mL (70 mLs Intravenous Given 4/17/22 1634)   Saline (80 mLs As instructed Given 4/17/22 1634)   LORazepam (ATIVAN) injection 0.5 mg (0.5 mg Intravenous Given 4/17/22 1905)   gadobutrol (GADAVIST) injection 10 mL (10 mLs Intravenous Given 4/17/22 2002)   aspirin (ASA) tablet 325 mg (325 mg Oral Given 4/17/22 2111)       Disposition:  The patient was admitted to the hospital under the care of Dr. EDWARD.    Impression & Plan        Medical Decision Making:  Patient presents with the following to the left side and leaning to the left side.  Family saw her in [place] 2 weeks ago and was normal symptoms of the left been for 3 days.  No stroke code was called due to patient's 72-hour history of falling to the left.  No signs of head injury.  Ultimately CT and CTA were recommended due to patient's claustrophobia this does identify likely a new area of stroke in the right thalamus as well as some vague vascular abnormality.  Care was discussed with the stroke code neurologist.  Ultimately MRI MRA of head and neck were also recommended.  This does identify a new stroke without vascular pathology EKG shows sinus rhythm recommend aspirin admission neurology consultation and echo.  Care was discussed with the hospitalist and was admitted in stable condition.    Covid-19  Lamont Olsen was evaluated during a global COVID-19 pandemic, which necessitated consideration that the patient might be at risk for infection with the SARS-CoV-2 virus that causes COVID-19.   Applicable protocols for evaluation were followed during the patient's care.   COVID-19 was considered as part of the patient's evaluation. The plan for testing is:  a test was obtained during this visit    Diagnosis:  1. Cerebrovascular accident (CVA) due to embolism of other cerebral artery (H)        Discharge Medications:  New Prescriptions    No medications on file         Scribe Disclosure:  IJasiel MD, am serving as a  scribe at 4:10 PM on 4/17/2022 to document services personally performed by Jasiel Hernández MD based on my observations and the provider's statements to me.      Jasiel Hernández MD  04/17/22 2119

## 2022-04-18 ENCOUNTER — APPOINTMENT (OUTPATIENT)
Dept: PHYSICAL THERAPY | Facility: CLINIC | Age: 77
DRG: 066 | End: 2022-04-18
Attending: PHYSICIAN ASSISTANT
Payer: COMMERCIAL

## 2022-04-18 ENCOUNTER — APPOINTMENT (OUTPATIENT)
Dept: CT IMAGING | Facility: CLINIC | Age: 77
DRG: 066 | End: 2022-04-18
Attending: HOSPITALIST
Payer: COMMERCIAL

## 2022-04-18 ENCOUNTER — APPOINTMENT (OUTPATIENT)
Dept: SPEECH THERAPY | Facility: CLINIC | Age: 77
DRG: 066 | End: 2022-04-18
Attending: PHYSICIAN ASSISTANT
Payer: COMMERCIAL

## 2022-04-18 ENCOUNTER — APPOINTMENT (OUTPATIENT)
Dept: CARDIOLOGY | Facility: CLINIC | Age: 77
DRG: 066 | End: 2022-04-18
Attending: PHYSICIAN ASSISTANT
Payer: COMMERCIAL

## 2022-04-18 LAB
ANION GAP SERPL CALCULATED.3IONS-SCNC: 5 MMOL/L (ref 3–14)
BUN SERPL-MCNC: 12 MG/DL (ref 7–30)
CALCIUM SERPL-MCNC: 9.4 MG/DL (ref 8.5–10.1)
CHLORIDE BLD-SCNC: 111 MMOL/L (ref 94–109)
CO2 SERPL-SCNC: 25 MMOL/L (ref 20–32)
CREAT SERPL-MCNC: 0.68 MG/DL (ref 0.52–1.04)
ERYTHROCYTE [DISTWIDTH] IN BLOOD BY AUTOMATED COUNT: 12.4 % (ref 10–15)
GFR SERPL CREATININE-BSD FRML MDRD: 89 ML/MIN/1.73M2
GLUCOSE BLD-MCNC: 116 MG/DL (ref 70–99)
GLUCOSE BLDC GLUCOMTR-MCNC: 125 MG/DL (ref 70–99)
GLUCOSE BLDC GLUCOMTR-MCNC: 126 MG/DL (ref 70–99)
GLUCOSE BLDC GLUCOMTR-MCNC: 94 MG/DL (ref 70–99)
HCT VFR BLD AUTO: 39.1 % (ref 35–47)
HGB BLD-MCNC: 12.7 G/DL (ref 11.7–15.7)
LVEF ECHO: NORMAL
MCH RBC QN AUTO: 30.1 PG (ref 26.5–33)
MCHC RBC AUTO-ENTMCNC: 32.5 G/DL (ref 31.5–36.5)
MCV RBC AUTO: 93 FL (ref 78–100)
PLATELET # BLD AUTO: 161 10E3/UL (ref 150–450)
POTASSIUM BLD-SCNC: 3.5 MMOL/L (ref 3.4–5.3)
RBC # BLD AUTO: 4.22 10E6/UL (ref 3.8–5.2)
SODIUM SERPL-SCNC: 141 MMOL/L (ref 133–144)
WBC # BLD AUTO: 5.9 10E3/UL (ref 4–11)

## 2022-04-18 PROCEDURE — 93306 TTE W/DOPPLER COMPLETE: CPT | Mod: 26 | Performed by: INTERNAL MEDICINE

## 2022-04-18 PROCEDURE — 999N000208 ECHOCARDIOGRAM COMPLETE

## 2022-04-18 PROCEDURE — 250N000011 HC RX IP 250 OP 636: Performed by: HOSPITALIST

## 2022-04-18 PROCEDURE — 85027 COMPLETE CBC AUTOMATED: CPT | Performed by: PHYSICIAN ASSISTANT

## 2022-04-18 PROCEDURE — 71275 CT ANGIOGRAPHY CHEST: CPT

## 2022-04-18 PROCEDURE — 93005 ELECTROCARDIOGRAM TRACING: CPT

## 2022-04-18 PROCEDURE — 99232 SBSQ HOSP IP/OBS MODERATE 35: CPT | Performed by: HOSPITALIST

## 2022-04-18 PROCEDURE — 36415 COLL VENOUS BLD VENIPUNCTURE: CPT | Performed by: PHYSICIAN ASSISTANT

## 2022-04-18 PROCEDURE — 255N000002 HC RX 255 OP 636: Performed by: HOSPITALIST

## 2022-04-18 PROCEDURE — 99232 SBSQ HOSP IP/OBS MODERATE 35: CPT | Mod: GC | Performed by: PSYCHIATRY & NEUROLOGY

## 2022-04-18 PROCEDURE — 92610 EVALUATE SWALLOWING FUNCTION: CPT | Mod: GN | Performed by: SPEECH-LANGUAGE PATHOLOGIST

## 2022-04-18 PROCEDURE — 97161 PT EVAL LOW COMPLEX 20 MIN: CPT | Mod: GP

## 2022-04-18 PROCEDURE — 92526 ORAL FUNCTION THERAPY: CPT | Mod: GN | Performed by: SPEECH-LANGUAGE PATHOLOGIST

## 2022-04-18 PROCEDURE — 93010 ELECTROCARDIOGRAM REPORT: CPT | Performed by: INTERNAL MEDICINE

## 2022-04-18 PROCEDURE — 120N000001 HC R&B MED SURG/OB

## 2022-04-18 PROCEDURE — 80048 BASIC METABOLIC PNL TOTAL CA: CPT | Performed by: PHYSICIAN ASSISTANT

## 2022-04-18 PROCEDURE — 97116 GAIT TRAINING THERAPY: CPT | Mod: GP

## 2022-04-18 PROCEDURE — 250N000013 HC RX MED GY IP 250 OP 250 PS 637: Performed by: PHYSICIAN ASSISTANT

## 2022-04-18 PROCEDURE — 250N000009 HC RX 250: Performed by: HOSPITALIST

## 2022-04-18 RX ORDER — IOPAMIDOL 755 MG/ML
80 INJECTION, SOLUTION INTRAVASCULAR ONCE
Status: COMPLETED | OUTPATIENT
Start: 2022-04-18 | End: 2022-04-18

## 2022-04-18 RX ADMIN — ASPIRIN 81 MG CHEWABLE TABLET 81 MG: 81 TABLET CHEWABLE at 08:02

## 2022-04-18 RX ADMIN — LATANOPROST 1 DROP: 50 SOLUTION/ DROPS OPHTHALMIC at 21:00

## 2022-04-18 RX ADMIN — SODIUM CHLORIDE 80 ML: 9 INJECTION, SOLUTION INTRAVENOUS at 17:53

## 2022-04-18 RX ADMIN — IOPAMIDOL 80 ML: 755 INJECTION, SOLUTION INTRAVENOUS at 17:52

## 2022-04-18 RX ADMIN — HUMAN ALBUMIN MICROSPHERES AND PERFLUTREN 9 ML: 10; .22 INJECTION, SOLUTION INTRAVENOUS at 13:36

## 2022-04-18 ASSESSMENT — ACTIVITIES OF DAILY LIVING (ADL)
ADLS_ACUITY_SCORE: 4
ADLS_ACUITY_SCORE: 4
ADLS_ACUITY_SCORE: 8
ADLS_ACUITY_SCORE: 4
ADLS_ACUITY_SCORE: 8
ADLS_ACUITY_SCORE: 8
ADLS_ACUITY_SCORE: 4

## 2022-04-18 NOTE — PROGRESS NOTES
[facility]  Hospitalist Progress Note        Mohinder Delacruz MD   04/18/2022        Interval History:        - reports feeling fine; denies any focal weakness  - MRA neck was normal with no mention of dissection  - awaiting ECHO, PT/OT and neurology evaluation         Assessment and Plan:        Lamont Olsen is a 77 year old female pmhx HLD with statin intolerance who presented with left leaning ataxia and speech change x 2-3 days found to have an acute right basal gangla infarction.    * CT H showed acute-subacute infarct involving lateral right thalamus and adjacent internal capsule.  * CTA head normal.  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  * MRI brain acute infarct R basal ganglia region.  * MRA brain and neck normal.     Acute right basal gangla infarction.  Possible right ICA dissection  Elevated BP  Dyslipidemia  - head and neck imagings as noted above  - MRA neck was normal with no mention of dissection  - awaiting ECHO, PT/OT and neurology evaluation  - continue aspirin  - EKG with NSR; A1c 5.6,  ; reports intolerance to statin (myopathy)-- will differ statins to neurology  - no prior history of hypertension and not on any BP meds; allowing permissive hypertension at this time    Addendum:  - reviewed ECHO:  Proximal septal thickening is noted.  The visual ejection fraction is 60-65%.  There is no thrombus seen in the left ventricle.  ABNORMAL ASC AORTA-sinus valsalva normal size. Asc aorta dilated to 42mm. On  the anterior side of asc aorta just above the sinotubular ridge there may be  an area of thickening. Cannot tell if this is artifact, penetrating aortic  ulcer/hematoma or less likely aortic tear. CT or ALLEN of this segment may be  useful especially in light of diagnosis of CVA. Report called to menjivar [facility] Cece    - will get CT aortic survey to further evaluate  "abnormal ascending aorta noted on ECHO     Asymptomatic COVID-19. Negative 4/17/2022.    Clinically Significant Risk Factors Present on Admission                  # Overweight: Estimated body mass index is 25.75 kg/m  as calculated from the following:    Height as of this encounter: 1.626 m (5' 4\").    Weight as of this encounter: 68 kg (150 lb).         DVT Prophylaxis: mechanical with PCD boots    Code status: full code    Disposition: pending therapy evaluation and neurology recs    Page Me (7 am to 6 pm)    Care plan discussed with patient and her  present in room              Physical Exam:      Blood pressure (!) 162/87, pulse 76, temperature 97.5  F (36.4  C), temperature source Oral, resp. rate 16, height 1.626 m (5' 4\"), weight 68 kg (150 lb), SpO2 95 %.  Vitals:    04/17/22 1551   Weight: 68 kg (150 lb)     Vital Signs with Ranges  Temp:  [97.3  F (36.3  C)-97.6  F (36.4  C)] 97.5  F (36.4  C)  Pulse:  [76-90] 76  Resp:  [16-18] 16  BP: (137-162)/(86-99) 162/87  SpO2:  [95 %-100 %] 95 %  I/O's Last 24 hours  No intake/output data recorded.    Constitutional: Alert, awake and oriented X 3; resting comfortably in no apparent distress   HEENT: Pupils equal and reactive to light and accomodation, neck supple    Oral cavity: Moist mucosa   Cardiovascular: Normal s1 s2, regular rate and rhythm, no murmur   Lungs: B/l clear to auscultation, no wheezes or crepitations   Abdomen: Soft, nt, nd, no guarding, rigidity or rebound; BS +   LE : No edema   Musculoskeletal: Power 5/5 in all extremities   Neuro: No focal neurological deficits noted   Psychiatry: normal mood and affect                Medications:          aspirin  81 mg Oral Daily    Or     aspirin  81 mg Oral or NG Tube Daily     latanoprost  1 drop Right Eye QPM     sodium chloride (PF)  3 mL Intracatheter Q8H     PRN Meds: acetaminophen, labetalol **OR** hydrALAZINE, lidocaine 4%, lidocaine (buffered or not buffered), - MEDICATION INSTRUCTIONS -, - " MEDICATION INSTRUCTIONS -, melatonin, ondansetron **OR** ondansetron, prochlorperazine **OR** prochlorperazine **OR** prochlorperazine, sodium chloride (PF), sodium chloride         Data:      All new lab and imaging data was reviewed.   Recent Labs   Lab Test 04/17/22  1615   WBC 7.0   HGB 13.6   MCV 92      INR 0.96      Recent Labs   Lab Test 04/17/22  1615      POTASSIUM 3.8   CHLORIDE 112*   CO2 26   BUN 15   CR 0.70   ANIONGAP 3   JOSESITO 9.3   *     No lab results found.    Invalid input(s): TROP, TROPONINIES

## 2022-04-18 NOTE — PROGRESS NOTES
04/18/22 1200   Quick Adds   Type of Visit Initial PT Evaluation   Living Environment   People in Home spouse   Current Living Arrangements condominium   Home Accessibility stairs to enter home;stairs within home   Number of Stairs, Main Entrance 1   Stair Railings, Main Entrance none   Number of Stairs, Within Home, Primary greater than 10 stairs   Stair Railings, Within Home, Primary railings safe and in good condition   Transportation Anticipated car, drives self   Living Environment Comments Pt lives with  in condo.  IND with all mobility and ADLs at baseline.   Self-Care   Usual Activity Tolerance good   Current Activity Tolerance good   Regular Exercise No   Equipment Currently Used at Home none   Fall history within last six months yes   Number of times patient has fallen within last six months 2   Activity/Exercise/Self-Care Comment reports falling at baseline even before CVA.   General Information   Onset of Illness/Injury or Date of Surgery 04/17/22   Referring Physician Barthell, Joanna Kersten Ulmen, PA-C   Patient/Family Therapy Goals Statement (PT) d.c home   Pertinent History of Current Problem (include personal factors and/or comorbidities that impact the POC) Lamont Olsen is a 77 year old female pmhx HLD with statin intolerance who presents with left leaning ataxia and speech change x 2-3 days found to have an acute right basal gangla infarction. Patient and spouse drove from [place] back to [place] over course of 2-3 days arriving back home on Friday 4/15. She had two falls that evening. The following day she was running into walls. Today children home for [facility] and noted her to left side to be slumping, left-leaning ataxia, and a quieter more mumbling speech pattern than usual appropriate volume and articulate self.   Heart Disease Risk Factors Age;Medical history   Cognition   Affect/Mental Status (Cognition) WFL   Orientation Status (Cognition) oriented x 4   Cognitive Status Comments not  impaired   Posture    Posture Not impaired   Strength (Manual Muscle Testing)   Strength Comments WFL   Bed Mobility   Comment, (Bed Mobility) Supine to sit with IND   Transfers   Comment, (Transfers) STS with CGA   Gait/Stairs (Locomotion)   Comment, (Gait/Stairs) Ambulates 20ft with IND   Balance   Balance Comments DGI performed in order to assess falls risk and aid in d/c planning   Clinical Impression   Criteria for Skilled Therapeutic Intervention Yes, treatment indicated   PT Diagnosis (PT) impaired balance   Clinical Presentation (PT Evaluation Complexity) Stable/Uncomplicated   Clinical Presentation Rationale clinical judgement   Clinical Decision Making (Complexity) low complexity   Planned Therapy Interventions (PT) gait training   Risk & Benefits of therapy have been explained care plan/treatment goals reviewed;evaluation/treatment results reviewed;risks/benefits reviewed;current/potential barriers reviewed;participants voiced agreement with care plan;participants included;patient;spouse/significant other   PT Discharge Planning   PT Discharge Recommendation (DC Rec) home;home with assist;home with outpatient physical therapy   PT Rationale for DC Rec pt is near baseline and safe to d/c home with OP PT   PT Brief overview of current status should amb  in halls 3-4x/day   Total Evaluation Time   Total Evaluation Time (Minutes) 15   Physical Therapy Goals   PT Frequency One time eval and treatment only   PT Predicted Duration/Target Date for Goal Attainment 04/18/22   PT Goals Gait   PT: Gait Greater than 200 feet;Independent

## 2022-04-18 NOTE — DISCHARGE INSTRUCTIONS
Follow up with PCP Monday April 25th 10:40 am Katharine Ricci   [phone] [facility] [address] [place] [address] [address]     Please follow up with neurology . You may call any of the following neurology clinics for an appointment or a clinic of your choice. Tell them you were recently hospitalized and need follow up as recommended at discharge.    1. [facility]   [address], [address]   [place], [place] [address] [phone]  2. [facility]   [address], [address]   [place] [address]   [phone]

## 2022-04-18 NOTE — PHARMACY-CONSULT NOTE
Pharmacy Consult to evaluate for medication related stroke core measures    KellyElaina Olsen, 77 year old female admitted for acute right basal gangla infarction on 4/17/2022.    Thrombolytic was not given because of Time from onset contraindications    VTE Prophylaxis SCDs /PCDs placed on 4/17, as appropriate prior to end of hospital day 2.    Antithrombotic: aspirin started on 4/17, as appropriate by end of hospital day 2. Continue antithrombotic therapy on discharge to meet quality measures, unless contraindicated.    Anticoagulation if history of A-fib/flutter: Patient does not have history of A-fib/flutter - anticoagulation not required for medication related stroke core measures.     LDL Cholesterol Calculated   Date Value Ref Range Status   04/17/2022 147 (H) <=100 mg/dL Final       Statins have not yet been prescribed as pt has a history of myopathy with statins.     Recommendations: LDL of 147 will need intervention     Thank you for the consult.    Phuong Hayes [facility] 4/18/2022 2:28 PM

## 2022-04-18 NOTE — PROGRESS NOTES
Dynamic Gait Index (DGI):The DGI is a measure of balance during gait that is reliable and valid for the elderly and individuals with Parkinson's disease, MS, vestibular disorders, or s/p stroke. Gait assistive device used: None    Scores ?19 /24 indicate an increased risk for falls according to Mallorie et al 2000  Minimal Detectable Change = 2.9 in community dwelling elderly according to Meng et al 2011    Assessment (rationale for performing, application to patient s function & care plan): DGI administered in order to assess falls risk and aid in discharge planning. Pt scores 21/24 indicating not at increased risk for falls. Safe to discharge home with OP PT.  (Minutes billed as physical performance test)

## 2022-04-18 NOTE — CONSULTS
Care Management Initial Consult    General Information  Assessment completed with: Patient, Family, Spouse or significant other,    Type of CM/SW Visit: Offer D/C Planning    Primary Care Provider verified and updated as needed:     Readmission within the last 30 days:        Reason for Consult: discharge planning  Advance Care Planning:            Communication Assessment  Patient's communication style: spoken language (English or Bilingual)    Hearing Difficulty or Deaf: no   Wear Glasses or Blind: no    Cognitive  Cognitive/Neuro/Behavioral: WDL        Orientation: oriented x 4     Best Language: 0 - No aphasia  Speech: clear, spontaneous, logical    Living Environment:   People in home: spouse     Current living Arrangements:        Able to return to prior arrangements:         Family/Social Support:  Care provided by:    Provides care for:    Marital Status:              Description of Support System:           Current Resources:   Patient receiving home care services: No     Community Resources: None  Equipment currently used at home: none  Supplies currently used at home:      Employment/Financial:  Employment Status: retired        Financial Concerns:             Lifestyle & Psychosocial Needs:  Social Determinants of Health     Tobacco Use: Medium Risk     Smoking Tobacco Use: Former Smoker     Smokeless Tobacco Use: Never Used   Alcohol Use: Not on file   Financial Resource Strain: Not on file   Food Insecurity: Not on file   Transportation Needs: Not on file   Physical Activity: Not on file   Stress: Not on file   Social Connections: Not on file   Intimate Partner Violence: Not on file   Depression: Not on file   Housing Stability: Not on file       Functional Status:  Prior to admission patient needed assistance:              Mental Health Status:          Chemical Dependency Status:                Values/Beliefs:  Spiritual, Cultural Beliefs, Gnosticist Practices, Values that affect care: no                Additional Information:  Met with patient and family to discuss discharge planning. Pt lives indep with spouse. No services in the home, Indep at baseline.   Reviewed therapy recommendations of OP PT/OT. Pt in agreement with this.   Expresses no needs for discharge.   Follow up made with PCP.  Monday April 24th 10:40am   Katharine Ricci [phone] [facility] [address] [address]      Awaiting final Neurology Reccs.  If CC available will schedule that follow up, otherwise daughter states she can.   Family to transport at discharge.     Erica Edwards RN BSN

## 2022-04-18 NOTE — CONSULTS
[facility]    Stroke Consult Note    Reason for Consult:  L weakness    Chief Complaint:  L weakness    HPI  KellyElaina Olsen is a 77 year old female hx HLD has not being able to tolerate multiple statins due to muscle pain, comes in with 3 days of difficulty with balance, ataxi gait leaning L, some word finding difficulty and speech non-fluency. CTH shows R BG acute/subacute infarct.    Stroke Evaluation Summarized    MRI/Head CT MRI brain acute vs subacute R BG infarct   Intracranial Vasculature HEAD CTA:   1.  No significant stenosis, aneurysm, or high flow vascular malformation identified.    MRA brain normal   Cervical Vasculature NECK CTA:  1.  No hemodynamically significant stenosis involving the neck vessels by NASCET criteria.  2.  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 This pseudoaneurysm measures approximately 2   mm in thickness as above.  3.  No findings of acute dissection    MRA neck - normal     Echocardiogram EF 60-65% LA borderline dilated no thrombus     Asc aorta dilated to 42mm. On the anterior side of asc aorta just above the sinotubular ridge there may be  an area of thickening. Cannot tell if this is artifact, penetrating aortic ulcer/hematoma or less likely aortic tear. CT or ALLEN of this segment may be useful especially in light of diagnosis of CVA.   EKG/Telemetry SR   Other Testing Not Applicable     LDL  4/17/2022: 147 mg/dL   A1C  4/17/2022: 5.6 %   Troponin No lab value available in past 48 hrs     Impression    78yo woman with RBG infarct in setting of HLD, most likely small vessel disease, will need aggressive bp and cholesterol management, has not tolerated statins in past would start on low dose statin and see if tolerates with later up titration. New afib lower on differential but should  "leave on monitor. Possible aortic tear on TTE please consult cards if needs further ghotra vs artifact.    Recommendations    - Neurochecks and Vital Signs every q4h   - Permissive HTN; goal SBP < 180 mmHg  - Daily aspirin 81 mg for secondary stroke prevention  - Statin: Simvastatin 10mg qhs  - Telemetry, EKG  - Bedside Glucose Monitoring  - PT/OT/SLP  - Stroke Education  - Euthermia, Euglycemia    - Possible aortic tear on TTE please consult cards if needs further ghotra vs artifact    Patient Follow-up    - final recommendation pending work-up    Thank you for this consult. We will continue to follow.     The Stroke Staff is Dr. Louie.    Jalen Kruse MD  Vascular Neurology Fellow  To page me or covering stroke neurology team member, click here: AMCOM   Choose \"On Call\" tab at top, then search dropdown box for \"Neurology Adult\", select location, press Enter, then look for stroke/neuro ICU/telestroke.    _____________________________________________________    Clinically Significant Risk Factors Present on Admission                        Past Medical History   Past Medical History:   Diagnosis Date     Hyperlipidemia      Malignant neoplasm (H) age 45    Uterine     Osteoarthritis      Past Surgical History   Past Surgical History:   Procedure Laterality Date     APPENDECTOMY  age 5     LUMPECTOMY BREAST       ORTHOPEDIC SURGERY  age 59    low back     ZZHC INCISION TENDON SHEATH FINGER      right thumb and 4th finger     Medications   Home Meds  Prior to Admission medications    Medication Sig Start Date End Date Taking? Authorizing Provider   CALCIUM PO Take 2 tablets by mouth daily   Yes Unknown, Entered By History   Cholecalciferol (VITAMIN D3) 125 MCG (5000 UT) TBDP Take 5,000 Units by mouth daily.   Yes Reported, Patient   diphenhydrAMINE-acetaminophen (TYLENOL PM)  MG tablet Take 2 tablets by mouth nightly as needed for sleep   Yes Unknown, Entered By History   Ibuprofen-diphenhydrAMINE HCl (ADVIL PM) 200-25 " MG CAPS Take 2 tablets by mouth nightly as needed   Yes Unknown, Entered By History   latanoprost (XALATAN) 0.005 % ophthalmic solution Place 1 drop into the right eye daily   Yes Unknown, Entered By History       Scheduled Meds    aspirin  81 mg Oral Daily    Or     aspirin  81 mg Oral or NG Tube Daily     latanoprost  1 drop Right Eye QPM     sodium chloride (PF)  3 mL Intracatheter Q8H       Infusion Meds    - MEDICATION INSTRUCTIONS -       - MEDICATION INSTRUCTIONS -       sodium chloride         PRN Meds  acetaminophen, labetalol **OR** hydrALAZINE, lidocaine 4%, lidocaine (buffered or not buffered), - MEDICATION INSTRUCTIONS -, - MEDICATION INSTRUCTIONS -, melatonin, ondansetron **OR** ondansetron, prochlorperazine **OR** prochlorperazine **OR** prochlorperazine, sodium chloride (PF), sodium chloride    Allergies   Allergies   Allergen Reactions     Codeine Sulfate      Family History   Family History   Problem Relation Age of Onset     C.A.D. Father      Cancer Maternal Grandmother         bone cancer     Breast Cancer Paternal Grandmother      Social History   Social History     Tobacco Use     Smoking status: Former Smoker     Smokeless tobacco: Never Used     Tobacco comment: quit 40 years ago   Substance Use Topics     Alcohol use: Yes     Comment: 1 drink per day       Review of Systems   The 10 point Review of Systems is negative other than noted in the HPI or here.        PHYSICAL EXAMINATION   Temp:  [97.5  F (36.4  C)-97.9  F (36.6  C)] 97.8  F (36.6  C)  Pulse:  [74-90] 88  Resp:  [16] 16  BP: (130-162)/(86-99) 130/91  SpO2:  [95 %-98 %] 96 %    Neurologic  Mental Status:  alert, oriented x 3, follows commands, speech clear and fluent, naming and repetition normal  Cranial Nerves:  visual fields intact, PERRL, EOMI with normal smooth pursuit, facial sensation intact and symmetric, facial movements symmetric, hearing not formally tested but intact to conversation, palate elevation symmetric and  uvula midline, no dysarthria, shoulder shrug strong bilaterally, tongue protrusion midline  Motor:  normal muscle tone and bulk, no abnormal movements, able to move all limbs spontaneously, strength 5/5 throughout upper and lower extremities, no pronator drift  Reflexes:  toes down-going  Sensory:  light touch sensation intact and symmetric throughout upper and lower extremities, no extinction on double simultaneous stimulation   Coordination:  Mild LUE dysmetria  Station/Gait:  deferred    Dysphagia Screen  Per Nursing    Stroke Scales    NIHSS  1a. Level of Consciousness 0-->Alert, keenly responsive   1b. LOC Questions 0-->Answers both questions correctly   1c. LOC Commands 0-->Performs both tasks correctly   2.   Best Gaze 0-->Normal   3.   Visual 0-->No visual loss   4.   Facial Palsy 0-->Normal symmetrical movements   5a. Motor Arm, Left 0-->No drift, limb holds 90 (or 45) degrees for full 10 secs   5b. Motor Arm, Right 0-->No drift, limb holds 90 (or 45) degrees for full 10 secs   6a. Motor Leg, Left 0-->No drift, leg holds 30 degree position for full 5 secs   6b. Motor Leg, right 0-->No drift, leg holds 30 degree position for full 5 secs   7.   Limb Ataxia 0-->Absent   8.   Sensory 0-->Normal, no sensory loss   9.   Best Language 0-->No aphasia, normal   10. Dysarthria 0-->Normal   11. Extinction and Inattention  0-->No abnormality   Total 0 (04/17/22 2218)       Modified [place] Score (Pre-morbid)  0    Imaging  I personally reviewed all imaging; relevant findings per HPI.    Labs Data   CBC  Recent Labs   Lab 04/18/22  0841 04/17/22  1615   WBC 5.9 7.0   RBC 4.22 4.53   HGB 12.7 13.6   HCT 39.1 41.7    191     Basic Metabolic Panel   Recent Labs   Lab 04/18/22  1624 04/18/22  1205 04/18/22  0841 04/17/22  1615   NA  --   --  141 141   POTASSIUM  --   --  3.5 3.8   CHLORIDE  --   --  111* 112*   CO2  --   --  25 26   BUN  --   --  12 15   CR  --   --  0.68 0.70   GLC 94 126* 116* 119*   JOSESITO  --   --   9.4 9.3     Liver Panel  Recent Labs   Lab 04/17/22  1615   PROTTOTAL 7.5   ALBUMIN 4.1   BILITOTAL 0.2   ALKPHOS 84   AST 25   ALT 36     INR    Recent Labs   Lab Test 04/17/22  1615   INR 0.96      Lipid Profile    Recent Labs   Lab Test 04/17/22  1615   CHOL 243*   HDL 55   *   TRIG 207*     A1C    Recent Labs   Lab Test 04/17/22  1615   A1C 5.6     Troponin I    Recent Labs   Lab 04/17/22  1615   TROPONINIS <3          Stroke Consult Data Data   This was a non-emergent, non-telestroke consult.

## 2022-04-18 NOTE — H&P
"    History and Physical  Hospitalist       Date of Admission:  4/17/2022    Assessment & Plan   Lamont Olsen is a 77 year old female pmhx HLD with statin intolerance who presents with left leaning ataxia and speech change x 2-3 days found to have an acute right basal gangla infarction.    Acute right basal gangla infarction.  Possible right ICA dissection.  * CT H showed acute-subacute infarct involving lateral right thalamus and adjacent internal capsule.  * CTA head normal.  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  * MRI brain acute infarct R basal ganglia region.  * MRA brain and neck normal.    - Admit to neuro floor with cardiac monitoring.  - Obtain echo, A1C, lipids, troponin.  - Appreciate neurology consult. Indicates antiplatelets if determined to have chronic appearing R ICA dissection vs anticoagulation if acute appearing dissection.  - NS 75ml / hr.  - Permissive HTN (not on meds PTA).  - Therapy evals.    HLD.  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  - Discussed some statin over none and trial rosuvastatin if not on previously.    Asymptomatic COVID-19. Negative 4/17/2022.    Clinically Significant Risk Factors Present on Admission                  # Overweight: Estimated body mass index is 25.75 kg/m  as calculated from the following:    Height as of this encounter: 1.626 m (5' 4\").    Weight as of this encounter: 68 kg (150 lb).        DVT Prophylaxis: Ambulate every shift  Code Status: Full Code    This patient was discussed with Dr. Reis of the Hospitalist Service who agrees with current plans as outlined above.    Disposition: Expected discharge in 1-2 days once cva eval completed and final neurology recommendations in place.    JoAnna K. Barthell, PA-C    Primary Care Physician   Jyoti Rose    Chief " Complaint   Ataxia    History is obtained from the patient, spouse, and chart review.    History of Present Illness   Lamont Olsen is a 77 year old female HLD historically intolerant to statins who presents with left-leaning ataxia and speech change x 2-3 days.    Patient and spouse drove from [place] back to [place] over course of 2-3 days arriving back home on Friday 4/15. She had two falls that evening. The following day she was running into walls. Today children home for [facility] and noted her to left side to be slumping, left-leaning ataxia, and a quieter more mumbling speech pattern than usual appropriate volume and articulate self.    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 MRI brain confirmed acute cva. MRA head and neck did not show any dissection.    Patient knows she has high cholesterol, but reports myopathy worst in hands despite trial of four different statins.    PAST MEDICAL HISTORY  Past Medical History:   Diagnosis Date     Hyperlipidemia      Malignant neoplasm (H) age 45    Uterine     Osteoarthritis        PAST SURGICAL HISTORY  Past Surgical History:   Procedure Laterality Date     APPENDECTOMY  age 5     LUMPECTOMY BREAST       ORTHOPEDIC SURGERY  age 59    low back     ZZHC INCISION TENDON SHEATH FINGER      right thumb and 4th finger   Bilateral knee arthroscopy.  CARLA/BSO.  Skin cancer excision.  Cyst removal from breast.  Right cataract removal    HOME MEDICATIONS  Prior to Admission medications    Medication Sig Last Dose Taking? Auth Provider   CALCIUM PO Take 2 tablets by mouth daily 4/17/2022 at Unknown time Yes Unknown, Entered By History   Cholecalciferol (VITAMIN D3) 125 MCG (5000 UT) TBDP Take 5,000 Units by mouth daily. 4/17/2022 at Unknown time Yes Reported, Patient   diphenhydrAMINE-acetaminophen (TYLENOL PM)  MG tablet Take 2 tablets by mouth  "nightly as needed for sleep PRN Yes Unknown, Entered By History   Ibuprofen-diphenhydrAMINE HCl (ADVIL PM) 200-25 MG CAPS Take 2 tablets by mouth nightly as needed PRN Yes Unknown, Entered By History   latanoprost (XALATAN) 0.005 % ophthalmic solution Place 1 drop into the right eye daily 4/17/2022 at Unknown time Yes Unknown, Entered By History       ALLERGIES  Allergies   Allergen Reactions     Codeine Sulfate        SOCIAL HISTORY   former smoker with estimated 20-pack-year history, quit 1970.  Has approximately 1 alcoholic beverage per day.  No illicit drug use.  She does not walk with a cane or a walker.    FAMILY HISTORY  family history includes Breast Cancer in her paternal grandmother; C.A.D. in her father; Cancer in her maternal grandmother.  Brother with heart disease.  Mother with TIA.    REVIEW OF SYSTEMS  A 10 point ROS was negative other than the symptoms noted above in the HPI.    Physical Exam   Nursing Notes Reviewed.  BP (!) 159/99   Pulse 90   Temp 97.3  F (36.3  C) (Temporal)   Resp 18   Ht 1.626 m (5' 4\")   Wt 68 kg (150 lb)   SpO2 100%   BMI 25.75 kg/m     General:  Pleasant female appears stated age in no acute distress. Subtle slurred speech.  Skin:  Warm, dry. No rashes or lesions on exposed skin.  HEENT:  Normocephalic, atraumatic; EOMs grossly intact.  Neck:  Supple.  Chest:  Breath sounds CTA and no increased work of breathing.  Cardiovascular:  RRR, no rub or murmur. No peripheral edema.  Abdomen:  Soft, non-tender, non-distended.  Musculoskeletal:  Moves all four extremities.  Neurological:  CN 2-12 intact. No facial asymmetry. No pronator drift. Nl FNF but slower on left. Nl heal down shin.    Data   Data reviewed today:  I reviewed all medications, new labs and imaging results over the last 24 hours. I personally reviewed the EKG tracing showing sinus rhythm..  Recent Labs   Lab 04/17/22  1615   WBC 7.0   HGB 13.6   MCV 92      INR 0.96      POTASSIUM 3.8 "   CHLORIDE 112*   CO2 26   BUN 15   CR 0.70   ANIONGAP 3   JOSESITO 9.3   *       Imaging:  Recent Results (from the past 24 hour(s))   CTA Head Neck with Contrast    Narrative    EXAM: CTA  HEAD NECK WITH CONTRAST, CT HEAD W/O CONTRAST  LOCATION: [facility]  DATE/TIME: 4/17/2022 4:27 PM    INDICATION: Vertigo, central  COMPARISON: None.  CONTRAST: 70mL Isovue 370  TECHNIQUE: Head and neck CT angiogram with IV contrast. Noncontrast head CT followed by axial helical CT images of the head and neck vessels obtained during the arterial phase of intravenous contrast administration. Axial 2D reconstructed images and   multiplanar 3D MIP reconstructed images of the head and neck vessels were performed by the technologist. Dose reduction techniques were used. All stenosis measurements made according to NASCET criteria unless otherwise specified.    FINDINGS:   NONCONTRAST HEAD CT:   INTRACRANIAL CONTENTS: Bandlike focus of low-attenuation change involving the anterolateral right thalamus and adjacent internal capsule measuring 6 mm in diameter and 22 mm in craniocaudal length. This suggests age-indeterminate small vessel ischemia   which may be subacute in nature. No intracranial hemorrhage, extra-axial collection, or mass effect. No CT evidence for large territorial infarct. Age-appropriate parenchymal attenuation. Age-appropriate brain parenchymal volume and ventricular size. No   hydrocephalus.     VISUALIZED ORBITS/SINUSES/MASTOIDS: Prior bilateral cataract surgery. Visualized portions of the orbits are otherwise unremarkable. No paranasal sinus mucosal disease. No middle ear or mastoid effusion.    BONES/SOFT TISSUES: No acute abnormality.    HEAD CTA:  ANTERIOR CIRCULATION: No stenosis/occlusion, aneurysm, or high flow vascular malformation. Standard [place] anatomy.    POSTERIOR CIRCULATION: No stenosis/occlusion, aneurysm, or high flow vascular malformation. Dominant left and  developmentally diminutive right vertebral arteries supply a normal caliber basilar artery.     DURAL VENOUS SINUSES: Expected enhancement of the major dural venous sinuses.    NECK CTA:  RIGHT CAROTID: No measurable stenosis or dissection. Focal outpouching concerning for a small pseudoaneurysm along the posterior margin of the distal right internal carotid artery with a small chronic appearing dissection flap. This measures 2 mm in   thickness (axial source CTA series 6 image 278 for example).    LEFT CAROTID: No measurable stenosis or dissection.    VERTEBRAL ARTERIES: No focal stenosis or dissection. Dominant left and developmentally diminutive right vertebral arteries.    AORTIC ARCH: Classic aortic arch anatomy with no significant stenosis at the origin of the great vessels.    NONVASCULAR STRUCTURES: 1.4 cm hyperattenuating left thyroid nodule. Mild multilevel cervical spondylosis. Included lung apices are clear.      Impression    IMPRESSION:   HEAD CT:  1.  Ovoid low-attenuation focus concerning for age-indeterminate small vessel infarct that may be acute to subacute in nature involving the lateral right thalamus and adjacent internal capsule. Head MRI could confirm the acuity of this finding.  2.  No hemorrhage, mass effect, or evidence for additional infarct.    HEAD CTA:   1.  No significant stenosis, aneurysm, or high flow vascular malformation identified.    NECK CTA:  1.  No hemodynamically significant stenosis involving the neck vessels by NASCET criteria.  2.  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 This pseudoaneurysm measures approximately 2   mm in thickness as above.  3.  No findings of acute dissection.   Head CT w/o contrast    Narrative    EXAM: CTA  HEAD NECK WITH CONTRAST, CT HEAD W/O CONTRAST  LOCATION: [facility]  DATE/TIME: 4/17/2022 4:27 PM    INDICATION: Vertigo, central  COMPARISON: None.  CONTRAST: 70mL Isovue 370  TECHNIQUE: Head and neck CT angiogram with IV contrast. Noncontrast head CT followed by axial helical CT images of the head and neck vessels obtained during the arterial phase of intravenous contrast administration. Axial 2D reconstructed images and   multiplanar 3D MIP reconstructed images of the head and neck vessels were performed by the technologist. Dose reduction techniques were used. All stenosis measurements made according to NASCET criteria unless otherwise specified.    FINDINGS:   NONCONTRAST HEAD CT:   INTRACRANIAL CONTENTS: Bandlike focus of low-attenuation change involving the anterolateral right thalamus and adjacent internal capsule measuring 6 mm in diameter and 22 mm in craniocaudal length. This suggests age-indeterminate small vessel ischemia   which may be subacute in nature. No intracranial hemorrhage, extra-axial collection, or mass effect. No CT evidence for large territorial infarct. Age-appropriate parenchymal attenuation. Age-appropriate brain parenchymal volume and ventricular size. No   hydrocephalus.     VISUALIZED ORBITS/SINUSES/MASTOIDS: Prior bilateral cataract surgery. Visualized portions of the orbits are otherwise unremarkable. No paranasal sinus mucosal disease. No middle ear or mastoid effusion.    BONES/SOFT TISSUES: No acute abnormality.    HEAD CTA:  ANTERIOR CIRCULATION: No stenosis/occlusion, aneurysm, or high flow vascular malformation. Standard [place] anatomy.    POSTERIOR CIRCULATION: No stenosis/occlusion, aneurysm, or high flow vascular malformation. Dominant left and developmentally diminutive right vertebral arteries supply a normal caliber basilar artery.     DURAL VENOUS SINUSES: Expected enhancement of the major dural venous sinuses.    NECK CTA:  RIGHT CAROTID: No measurable stenosis or dissection. Focal outpouching concerning  for a small pseudoaneurysm along the posterior margin of the distal right internal carotid artery with a small chronic appearing dissection flap. This measures 2 mm in   thickness (axial source CTA series 6 image 278 for example).    LEFT CAROTID: No measurable stenosis or dissection.    VERTEBRAL ARTERIES: No focal stenosis or dissection. Dominant left and developmentally diminutive right vertebral arteries.    AORTIC ARCH: Classic aortic arch anatomy with no significant stenosis at the origin of the great vessels.    NONVASCULAR STRUCTURES: 1.4 cm hyperattenuating left thyroid nodule. Mild multilevel cervical spondylosis. Included lung apices are clear.      Impression    IMPRESSION:   HEAD CT:  1.  Ovoid low-attenuation focus concerning for age-indeterminate small vessel infarct that may be acute to subacute in nature involving the lateral right thalamus and adjacent internal capsule. Head MRI could confirm the acuity of this finding.  2.  No hemorrhage, mass effect, or evidence for additional infarct.    HEAD CTA:   1.  No significant stenosis, aneurysm, or high flow vascular malformation identified.    NECK CTA:  1.  No hemodynamically significant stenosis involving the neck vessels by NASCET criteria.  2.  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 This pseudoaneurysm measures approximately 2   mm in thickness as above.  3.  No findings of acute dissection.   MRA Neck (Carotids) wo & w Contrast    Narrative    EXAM: MRA NECK (CAROTIDS) WO and W CONTRAST  LOCATION: [facility]  DATE/TIME: 4/17/2022 7:22 PM    INDICATION: Neuro deficit, acute, stroke suspected  COMPARISON: None.  CONTRAST: 10mL Gadavist  TECHNIQUE: Neck MRA without and with IV contrast. Stenosis measurements made according to NASCET criteria unless otherwise  specified.    FINDINGS:  RIGHT CAROTID: No measurable stenosis or dissection.    LEFT CAROTID: No measurable stenosis or dissection.    VERTEBRAL ARTERIES: No focal stenosis or dissection. Balanced vertebral arteries.    AORTIC ARCH: Classic aortic arch anatomy with no significant stenosis at the origin of the great vessels.      Impression    IMPRESSION:  1.  Normal neck MRA.   MR Brain w/o & w Contrast    Narrative    EXAM: MR BRAIN W/O and W CONTRAST  LOCATION: [facility]  DATE/TIME: 4/17/2022 7:16 PM    INDICATION: Neuro deficit, acute, stroke suspected  COMPARISON: None.  CONTRAST: 10 mL Gadavist  TECHNIQUE: Routine multiplanar multisequence head MRI without and with intravenous contrast.    FINDINGS:  INTRACRANIAL CONTENTS: There is a 2 x 1 cm diffusion hyperintense lesion (with corresponding ADC restriction) in the region of the right basal ganglia and genu of the internal capsule, consistent with an acute infarct. The lesion is mildly hyperintense   on FLAIR and T2 images. No mass, acute hemorrhage, or extra-axial fluid collections. Scattered nonspecific T2/FLAIR hyperintensities within the cerebral white matter most consistent with mild chronic microvascular ischemic change. Normal ventricles and   sulci. Normal position of the cerebellar tonsils. No pathologic contrast enhancement.    SELLA: No abnormality accounting for technique.    OSSEOUS STRUCTURES/SOFT TISSUES: Normal marrow signal. The major intracranial vascular flow voids are maintained.     ORBITS: Prior bilateral cataract surgery. Visualized portions of the orbits are otherwise unremarkable.     SINUSES/MASTOIDS: No paranasal sinus mucosal disease. No middle ear or mastoid effusion.       Impression    IMPRESSION:  1.  Findings consistent with an acute infarct right basal ganglia region.   MRA Brain ([place]) wo Contrast    Narrative    EXAM: MRA BRAIN ([place]) WO CONTRAST  LOCATION: [facility]  DATE/TIME: 4/17/2022 7:17 PM    INDICATION: Neuro deficit, acute, stroke suspected  COMPARISON: None.  TECHNIQUE: 3D time-of-flight head MRA without intravenous contrast.    FINDINGS:  ANTERIOR CIRCULATION: No stenosis/occlusion, aneurysm or high flow vascular malformation. Standard [place] anatomy.    POSTERIOR CIRCULATION: No stenosis/occlusion, aneurysm, or high flow vascular malformation. Dominant left and smaller right vertebral artery contribute to a normal basilar artery.       Impression    IMPRESSION:  1.  Normal MRA [place].

## 2022-04-18 NOTE — PLAN OF CARE
Occupational Therapy: Orders received. Chart reviewed and discussed with care team.? Occupational Therapy not indicated due to pt with no ADL concerns or deficits impacting ADL per PT screen.? Defer discharge recommendations to PT and care team.? Will complete orders.

## 2022-04-18 NOTE — PHARMACY-ADMISSION MEDICATION HISTORY
Pharmacy Medication History  Admission medication history interview status for the 4/17/2022  admission is complete. See EPIC admission navigator for prior to admission medications     Location of Interview: Patient room  Medication history sources: Patient    Significant changes made to the medication list:  Removed aspirin, atorvastatin, naproxen  Added Advil PM, Tylenol PM, latanoprost    In the past week, patient estimated taking medication this percent of the time: greater than 90%    Additional medication history information:   Takes either Advil PM or Tylenol Pm as needed for sleep    Medication reconciliation completed by provider prior to medication history? No    Time spent in this activity: 5 minutes    Prior to Admission medications    Medication Sig Last Dose Taking? Auth Provider   CALCIUM PO Take 2 tablets by mouth daily 4/17/2022 at Unknown time Yes Unknown, Entered By History   Cholecalciferol (VITAMIN D3) 125 MCG (5000 UT) TBDP Take 5,000 Units by mouth daily. 4/17/2022 at Unknown time Yes Reported, Patient   diphenhydrAMINE-acetaminophen (TYLENOL PM)  MG tablet Take 2 tablets by mouth nightly as needed for sleep PRN Yes Unknown, Entered By History   Ibuprofen-diphenhydrAMINE HCl (ADVIL PM) 200-25 MG CAPS Take 2 tablets by mouth nightly as needed PRN Yes Unknown, Entered By History   latanoprost (XALATAN) 0.005 % ophthalmic solution Place 1 drop into the right eye daily 4/17/2022 at Unknown time Yes Unknown, Entered By History       The information provided in this note is only as accurate as the sources available at the time of update(s)      Diagnosis/Prognosis/Treatment Options/MOLST Discussed/Hospice Referral

## 2022-04-18 NOTE — PLAN OF CARE
Physical Therapy Discharge Summary    Reason for therapy discharge:    All goals and outcomes met, no further needs identified.    Progress towards therapy goal(s). See goals on Care Plan in [facility] electronic health record for goal details.  Goals met    Therapy recommendation(s):    Continued therapy is recommended.  Rationale/Recommendations:  OP PT.

## 2022-04-18 NOTE — PROGRESS NOTES
"   04/18/22 1031   General Information   Onset of Illness/Injury or Date of Surgery 04/17/22   Referring Physician Barthell, Joanna Kersten Ulmen, PA-C   Patient/Family Therapy Goal Statement (SLP) Patient would like to go home.   Pertinent History of Current Problem Per MD note: \"Lamont Olsen is a 77 year old female pmhx HLD with statin intolerance who presents with left leaning ataxia and speech change x 2-3 days found to have an acute right basal gangla infarction. Patient and spouse drove from [place] back to [place] over course of 2-3 days arriving back home on Friday 4/15. She had two falls that evening. The following day she was running into walls. Today children home for [facility] and noted her to left side to be slumping, left-leaning ataxia, and a quieter more mumbling speech pattern than usual appropriate volume and articulate self.\"   General Observations Patient alert, interactive. Patient's spouse present initially and reported persistent speech changes noted by himself and patient's sisters. Patient, spouse and RN reported no swallowing difficulty at meals or with pills. 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   Past History of Dysphagia None per EMR review.   Type of Evaluation   Type of Evaluation Swallow Evaluation   Oral Motor   Oral Musculature anomalies present   Structural Abnormalities none present   Mucosal Quality good   Dentition (Oral Motor)   Dentition (Oral Motor) natural dentition;adequate dentition   Facial Symmetry (Oral Motor)   Facial Symmetry (Oral Motor) left side impairment   Left Side Facial Asymmetry minimal impairment   Lip Function (Oral Motor)   Lip Range of Motion (Oral Motor) WNL   Retraction, Lip Range of Motion left side;minimal impairment   Lip Strength (Oral Motor) WFL   Lip Coordination (Oral Motor) minimal impairment  (speech AM[facility]'s (/pa/))   Tongue Function (Oral Motor)   Tongue ROM (Oral Motor) protrusion is impaired   Protrusion, Tongue ROM Impairment " (Oral Motor) minimal impairment  (minimal deviation to left side?)   Tongue Strength (Oral Motor) WFL   Tongue Coordination/Speed (Oral Motor) reduced rate   Jaw Function (Oral Motor)   Jaw Function (Oral Motor) WNL   Cough/Swallow/Gag Reflex (Oral Motor)   Soft Palate/Velum (Oral Motor) elevation decreased on left  (minimal)   Volitional Throat Clear/Cough (Oral Motor) WNL   Vocal Quality/Secretion Management (Oral Motor)   Vocal Quality (Oral Motor) WFL   Secretion Management (Oral Motor) WNL   General Swallowing Observations   Respiratory Support (General Swallowing Observations) none   Current Diet/Method of Nutritional Intake (General Swallowing Observations, NIS) regular diet;thin liquids (level 0)   Swallowing Evaluation Clinical swallow evaluation   Clinical Swallow Evaluation   Feeding Assistance no assistance needed   Clinical Swallow Evaluation Textures Trialed thin liquids;pureed;solid foods   Clinical Swallow Eval: Thin Liquid Texture Trial   Mode of Presentation, Thin Liquids cup;straw;self-fed   Volume of Liquid or Food Presented 4 oz thin water   Oral Phase of Swallow   (mild incoordination)   Pharyngeal Phase of Swallow throat clearing  (x2 instances)   Diagnostic Statement No other overt aspiration signs occurred.   Clinical Swallow Evaluation: Puree Solid Texture Trial   Mode of Presentation, Puree spoon;self-fed   Volume of Puree Presented 3 bites applesauce   Oral Phase, Puree WFL   Pharyngeal Phase, Puree   (no overt aspiration signs)   Clinical Swallow Evaluation: Solid Food Texture Trial   Mode of Presentation self-fed   Volume Presented 1/2 nichole cracker square   Oral Phase WFL   Pharyngeal Phase   (no overt aspiration signs)   Esophageal Phase of Swallow   Patient reports or presents with symptoms of esophageal dysphagia No   Swallowing Recommendations   Diet Consistency Recommendations regular diet;thin liquids (level 0)   Supervision Level for Intake patient independent   Mode of  Delivery Recommendations bolus size, small;slow rate of intake   Monitoring/Assistance Required (Eating/Swallowing) monitor for cough or change in vocal quality with intake   Recommended Feeding/Eating Techniques (Swallow Eval) maintain upright sitting position for eating   Medication Administration Recommendations, Swallowing (SLP) One at a time   General Therapy Interventions   Planned Therapy Interventions Dysphagia Treatment   Dysphagia treatment Instruction of safe swallow strategies   Clinical Impression   Criteria for Skilled Therapeutic Interventions Met (SLP Eval) Yes, treatment indicated   SLP Diagnosis Mild dysphagia   Risks & Benefits of therapy have been explained evaluation/treatment results reviewed;care plan/treatment goals reviewed;risks/benefits reviewed;current/potential barriers reviewed;participants voiced agreement with care plan;participants included;patient   Clinical Impression Comments Mild oropharyngeal dysphagia characterized by mild oral motor impairment on left side, mild oral bolus incoordination and 2 instances of throat clearing after sips of thin liquid. No other overt aspiration signs occurred with multiple sips thin liquid, puree or solid texture. No oral residue remained. Patient reported no globus sensation. Mild ataxic dysarthria charaterized by irregular and imprecise articulation speech pattern. Speech intelligibility WFL for routine conversation this morning. Recommend continue regular diet with thin liquids given swallow strategies (fully upright position, small sips/bites, slow rate). SLP to follow up for diet tolerance, swallow strategy training and speech/language eval as indicated.   SLP Discharge Planning   SLP Discharge Recommendation home with outpatient speech therapy  (pending progress)   SLP Rationale for DC Rec Patient with mild dysarthria and dysphagia   SLP Brief overview of current status  Recommend continue regular diet with thin liquids given swallow  strategies (fully upright position, small sips/bites, slow rate). SLP to follow up for diet tolerance, swallow strategy training and speech/language eval as indicated.    Total Evaluation Time   Total Evaluation Time (Minutes) 10   SLP Goals   Therapy Frequency (SLP Eval) 5 times/wk   SLP Predicted Duration/Target Date for Goal Attainment 05/02/22   SLP Goals Swallow   SLP: Safely tolerate diet without signs/symptoms of aspiration Regular diet;Thin liquids;With use of swallow precautions

## 2022-04-18 NOTE — ED NOTES
RECEIVING UNIT ED HANDOFF REVIEW    ED Nurse Handoff Report was reviewed by: Chintan Garg RN on April 17, 2022 at 9:35 PM

## 2022-04-19 ENCOUNTER — APPOINTMENT (OUTPATIENT)
Dept: EDUCATION SERVICES | Facility: CLINIC | Age: 77
End: 2022-04-19
Attending: PHYSICIAN ASSISTANT
Payer: COMMERCIAL

## 2022-04-19 ENCOUNTER — HOSPITAL ENCOUNTER (INPATIENT)
Dept: CARDIOLOGY | Facility: CLINIC | Age: 77
Discharge: HOME | DRG: 066 | End: 2022-04-19
Attending: HOSPITALIST | Admitting: HOSPITALIST
Payer: COMMERCIAL

## 2022-04-19 VITALS
HEART RATE: 78 BPM | HEIGHT: 64 IN | OXYGEN SATURATION: 93 % | SYSTOLIC BLOOD PRESSURE: 128 MMHG | BODY MASS INDEX: 25.61 KG/M2 | WEIGHT: 150 LBS | TEMPERATURE: 98.2 F | DIASTOLIC BLOOD PRESSURE: 80 MMHG | RESPIRATION RATE: 18 BRPM

## 2022-04-19 DIAGNOSIS — I63.49 CEREBROVASCULAR ACCIDENT (CVA) DUE TO EMBOLISM OF OTHER CEREBRAL ARTERY (H): ICD-10-CM

## 2022-04-19 LAB — GLUCOSE BLDC GLUCOMTR-MCNC: 94 MG/DL (ref 70–99)

## 2022-04-19 PROCEDURE — 93270 REMOTE 30 DAY ECG REV/REPORT: CPT

## 2022-04-19 PROCEDURE — 93272 ECG/REVIEW INTERPRET ONLY: CPT | Performed by: INTERNAL MEDICINE

## 2022-04-19 PROCEDURE — 250N000013 HC RX MED GY IP 250 OP 250 PS 637: Performed by: PHYSICIAN ASSISTANT

## 2022-04-19 PROCEDURE — 99239 HOSP IP/OBS DSCHRG MGMT >30: CPT | Performed by: HOSPITALIST

## 2022-04-19 PROCEDURE — 99232 SBSQ HOSP IP/OBS MODERATE 35: CPT | Mod: GC | Performed by: PSYCHIATRY & NEUROLOGY

## 2022-04-19 RX ORDER — AMLODIPINE BESYLATE 2.5 MG/1
2.5 TABLET ORAL DAILY
Qty: 30 TABLET | Refills: 0 | Status: SHIPPED | OUTPATIENT
Start: 2022-04-19 | End: 2022-05-19

## 2022-04-19 RX ORDER — SIMVASTATIN 10 MG
10 TABLET ORAL AT BEDTIME
Status: DISCONTINUED | OUTPATIENT
Start: 2022-04-19 | End: 2022-04-19 | Stop reason: HOSPADM

## 2022-04-19 RX ORDER — SIMVASTATIN 10 MG
10 TABLET ORAL AT BEDTIME
Qty: 30 TABLET | Refills: 0 | Status: SHIPPED | OUTPATIENT
Start: 2022-04-19 | End: 2022-05-19

## 2022-04-19 RX ADMIN — ASPIRIN 81 MG: 81 TABLET, COATED ORAL at 08:15

## 2022-04-19 ASSESSMENT — ACTIVITIES OF DAILY LIVING (ADL)
ADLS_ACUITY_SCORE: 4

## 2022-04-19 NOTE — PROGRESS NOTES
Patient seen and examined    - no new or any residual focal neurological deficits  - CT chest 4/18 to evaluate for abnormal ECHO noted with no aortic dissection or penetrating ulcer  - telemetry with normal sinus rhythm  - evaluated by neurology and suggested trial of low dose simvastatin  - BP mostly in 130s---150s; will start low dose amlodipine 2.5 mg daily  -continue aspirin  - Therapies rec home with assist    Plan to discharge home today on event monitor if okay with neurology    Care plan discussed with nursing, patient and her family including son and  present in the room.    Please see discharge summary for details

## 2022-04-19 NOTE — PLAN OF CARE
Reason for Admission: R basal ganglia infarct     Cognitive/Mentation: A/Ox 4  Neuros/CMS: Intact   VS: stable.   Tele: NSR.  GI: BS active x4, passing flatus, last BM 4/18/22. Continent.  : Voiding without difficutly. Continent.  Pulmonary: LS clear throughout.  Pain: denies.     Drains/Lines: n/a  Skin: intact  Activity: Independent.  Diet: regular with thin liquids. Takes pills whole with water.     Therapies recs: home  Discharge: today to home with family    Aggression Stoplight Tool: green    End of shift summary: Reviewed AVS. discharge with meds to home. Questions answered.

## 2022-04-19 NOTE — DISCHARGE SUMMARY
"[facility]  Discharge Summary        Lamont Olsen MRN# [id number]   YOB: 1945 Age: 77 year old     Date of Admission:  4/17/2022  Date of Discharge:  4/19/2022  Admitting Physician:  Apolinar Reis MD  Discharge Physician: Mohinder Delacruz MD  Discharging Service: Hospitalist     Primary Provider: Katharine Ricci  Primary Care Physician Phone Number: [phone]         Discharge Diagnoses/Problem Oriented Hospital Course (Providers):    Lamont Olsen was admitted on 4/17/2022 by Apolinar Reis MD and I would refer you to their history and physical.  The following problems were addressed during her hospitalization:    Lamont Olsen is a 77 year old female pmhx HLD with statin intolerance who presented with left leaning ataxia and speech change x 2-3 days found to have an acute right basal gangla infarction.     * CT H showed acute-subacute infarct involving lateral right thalamus and adjacent internal capsule.  * CTA head normal.  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  * MRI brain acute infarct R basal ganglia region.  * MRA brain and neck normal.     Acute right basal gangla infarction.  Elevated BP  Dyslipidemia  Dilated Ascending aorta  - head and neck imagings as noted above  - MRA neck was normal with no mention of dissection  - A1c 5.6,  ; reports intolerance to statin (myopathy)  - no new or any residual focal neurological deficits  - ECHO 4/18 noted with EF 60-65%, no thrombus seen in the left ventricle; ECHO did note abnormal ascending aorta with dilatation to 42 mm and also concern for \" penetrating aortic  ulcer/hematoma or less likely aortic tear\"  - CT chest 4/18 to evaluate for abnormal ECHO noted with no aortic dissection or penetrating ulcer  - telemetry with normal sinus rhythm; event monitor ordered for discharge  - evaluated " "by neurology and suggested trial of low dose simvastatin 10 mg bedtime (previosuly intolerant to statins)  -allowed permissive hypertension ; BP mostly in 130s---150s at the time of discharge ; will start low dose amlodipine 2.5 mg daily upon discharge  - continue aspirin 81 mg daily  - Therapies rec home with assist     Asymptomatic COVID-19. Negative 4/17/2022.     Clinically Significant Risk Factors Present on Admission                     # Overweight: Estimated body mass index is 25.75 kg/m  as calculated from the following:    Height as of this encounter: 1.626 m (5' 4\").    Weight as of this encounter: 68 kg (150 lb).     Code status: full code              Brief Hospital Stay Summary Sent Home With Patient in AVS:        Reason for your hospital stay      You were admitted with Stroke and you have been started on Aspirin, a   blood pressure and low dose cholesterol medicine. You are being sent home   with an event monitor.                 Pending Results:        Unresulted Labs Ordered in the Past 30 Days of this Admission     No orders found from 3/18/2022 to 4/18/2022.            Discharge Instructions and Follow-Up:      Follow-up Appointments     Follow-up and recommended labs and tests       Follow up with PCP Monday April 24th 10:40 am Katharine Ricci     [phone] [address] [address]    Follow up with Neurology Stroke Clinic in 4-6 weeks or as instructed.                 Discharge Disposition:      Discharged to home        Discharge Medications:        Current Discharge Medication List      START taking these medications    Details   amLODIPine (NORVASC) 2.5 MG tablet Take 1 tablet (2.5 mg) by mouth daily  Qty: 30 tablet, Refills: 0    Comments: Future refills by PCP Dr. Katharine Ricci with phone number [phone].  Associated Diagnoses: Cerebrovascular accident (CVA) due to embolism of other cerebral artery (H)      aspirin (ASA) 81 MG EC tablet Take 1 " "tablet (81 mg) by mouth daily  Qty: 30 tablet, Refills: 0    Comments: Future refills by PCP Dr. Katharine Ricci with phone number [phone].  Associated Diagnoses: Cerebrovascular accident (CVA) due to embolism of other cerebral artery (H)      simvastatin (ZOCOR) 10 MG tablet Take 1 tablet (10 mg) by mouth At Bedtime  Qty: 30 tablet, Refills: 0    Comments: Future refills by PCP Dr. Katharine Ricci with phone number [phone].  Associated Diagnoses: Cerebrovascular accident (CVA) due to embolism of other cerebral artery (H)         CONTINUE these medications which have NOT CHANGED    Details   CALCIUM PO Take 2 tablets by mouth daily      Cholecalciferol (VITAMIN D3) 125 MCG (5000 UT) TBDP Take 5,000 Units by mouth daily.      diphenhydrAMINE-acetaminophen (TYLENOL PM)  MG tablet Take 2 tablets by mouth nightly as needed for sleep      Ibuprofen-diphenhydrAMINE HCl (ADVIL PM) 200-25 MG CAPS Take 2 tablets by mouth nightly as needed      latanoprost (XALATAN) 0.005 % ophthalmic solution Place 1 drop into the right eye daily               Allergies:         Allergies   Allergen Reactions     Codeine Sulfate            Consultations This Hospital Stay:      Consultation during this admission received from neurology        Condition and Physical on Discharge:      Discharge condition: Stable   Vitals: Blood pressure 128/80, pulse 78, temperature 98.2  F (36.8  C), temperature source Oral, resp. rate 18, height 1.626 m (5' 4\"), weight 68 kg (150 lb), SpO2 93 %.     Constitutional: Alert, awake and orienetd X 3; lying comfortably in bed in no apparent distress   HEENT: Pupils equal and reactive to light and accomodation, EOMI intact; neck supple no raised JVD or rigidity    Oral cavity: Moist mucosa   Cardiovascular: Normal s1 s2, regular rate and rhythm, no murmur   Lungs: B/l clear to auscultation, no wheezes or crepitations   Abdomen: Soft, nt, nd, no guarding, rigidity or rebound; BS +   LE : No " edema   Musculoskeletal: Power 5/5 in all extremities   Neuro: No focal neurological deficits noted, CN II to XII grossly intact   Psychiatry: normal mood and affect             Discharge Time:      Greater than 30 minutes.        Image Results From This Hospital Stay (For Non-EPIC Providers):        Results for orders placed or performed during the hospital encounter of 04/17/22   Head CT w/o contrast    Narrative    EXAM: CTA  HEAD NECK WITH CONTRAST, CT HEAD W/O CONTRAST  LOCATION: [facility]  DATE/TIME: 4/17/2022 4:27 PM    INDICATION: Vertigo, central  COMPARISON: None.  CONTRAST: 70mL Isovue 370  TECHNIQUE: Head and neck CT angiogram with IV contrast. Noncontrast head CT followed by axial helical CT images of the head and neck vessels obtained during the arterial phase of intravenous contrast administration. Axial 2D reconstructed images and   multiplanar 3D MIP reconstructed images of the head and neck vessels were performed by the technologist. Dose reduction techniques were used. All stenosis measurements made according to NASCET criteria unless otherwise specified.    FINDINGS:   NONCONTRAST HEAD CT:   INTRACRANIAL CONTENTS: Bandlike focus of low-attenuation change involving the anterolateral right thalamus and adjacent internal capsule measuring 6 mm in diameter and 22 mm in craniocaudal length. This suggests age-indeterminate small vessel ischemia   which may be subacute in nature. No intracranial hemorrhage, extra-axial collection, or mass effect. No CT evidence for large territorial infarct. Age-appropriate parenchymal attenuation. Age-appropriate brain parenchymal volume and ventricular size. No   hydrocephalus.     VISUALIZED ORBITS/SINUSES/MASTOIDS: Prior bilateral cataract surgery. Visualized portions of the orbits are otherwise unremarkable. No paranasal sinus mucosal disease. No middle ear or mastoid effusion.    BONES/SOFT TISSUES: No acute abnormality.    HEAD  CTA:  ANTERIOR CIRCULATION: No stenosis/occlusion, aneurysm, or high flow vascular malformation. Standard [place] anatomy.    POSTERIOR CIRCULATION: No stenosis/occlusion, aneurysm, or high flow vascular malformation. Dominant left and developmentally diminutive right vertebral arteries supply a normal caliber basilar artery.     DURAL VENOUS SINUSES: Expected enhancement of the major dural venous sinuses.    NECK CTA:  RIGHT CAROTID: No measurable stenosis or dissection. Focal outpouching concerning for a small pseudoaneurysm along the posterior margin of the distal right internal carotid artery with a small chronic appearing dissection flap. This measures 2 mm in   thickness (axial source CTA series 6 image 278 for example).    LEFT CAROTID: No measurable stenosis or dissection.    VERTEBRAL ARTERIES: No focal stenosis or dissection. Dominant left and developmentally diminutive right vertebral arteries.    AORTIC ARCH: Classic aortic arch anatomy with no significant stenosis at the origin of the great vessels.    NONVASCULAR STRUCTURES: 1.4 cm hyperattenuating left thyroid nodule. Mild multilevel cervical spondylosis. Included lung apices are clear.      Impression    IMPRESSION:   HEAD CT:  1.  Ovoid low-attenuation focus concerning for age-indeterminate small vessel infarct that may be acute to subacute in nature involving the lateral right thalamus and adjacent internal capsule. Head MRI could confirm the acuity of this finding.  2.  No hemorrhage, mass effect, or evidence for additional infarct.    HEAD CTA:   1.  No significant stenosis, aneurysm, or high flow vascular malformation identified.    NECK CTA:  1.  No hemodynamically significant stenosis involving the neck vessels by NASCET criteria.  2.  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 This pseudoaneurysm measures approximately 2   mm in thickness as above.  3.  No findings of acute dissection.   CTA Head Neck with Contrast    Narrative    EXAM: CTA  HEAD NECK WITH CONTRAST, CT HEAD W/O CONTRAST  LOCATION: [facility]  DATE/TIME: 4/17/2022 4:27 PM    INDICATION: Vertigo, central  COMPARISON: None.  CONTRAST: 70mL Isovue 370  TECHNIQUE: Head and neck CT angiogram with IV contrast. Noncontrast head CT followed by axial helical CT images of the head and neck vessels obtained during the arterial phase of intravenous contrast administration. Axial 2D reconstructed images and   multiplanar 3D MIP reconstructed images of the head and neck vessels were performed by the technologist. Dose reduction techniques were used. All stenosis measurements made according to NASCET criteria unless otherwise specified.    FINDINGS:   NONCONTRAST HEAD CT:   INTRACRANIAL CONTENTS: Bandlike focus of low-attenuation change involving the anterolateral right thalamus and adjacent internal capsule measuring 6 mm in diameter and 22 mm in craniocaudal length. This suggests age-indeterminate small vessel ischemia   which may be subacute in nature. No intracranial hemorrhage, extra-axial collection, or mass effect. No CT evidence for large territorial infarct. Age-appropriate parenchymal attenuation. Age-appropriate brain parenchymal volume and ventricular size. No   hydrocephalus.     VISUALIZED ORBITS/SINUSES/MASTOIDS: Prior bilateral cataract surgery. Visualized portions of the orbits are otherwise unremarkable. No paranasal sinus mucosal disease. No middle ear or mastoid effusion.    BONES/SOFT TISSUES: No acute abnormality.    HEAD CTA:  ANTERIOR CIRCULATION: No stenosis/occlusion, aneurysm, or high flow vascular malformation. Standard [place] anatomy.    POSTERIOR CIRCULATION: No stenosis/occlusion, aneurysm, or high flow vascular malformation. Dominant left and developmentally  diminutive right vertebral arteries supply a normal caliber basilar artery.     DURAL VENOUS SINUSES: Expected enhancement of the major dural venous sinuses.    NECK CTA:  RIGHT CAROTID: No measurable stenosis or dissection. Focal outpouching concerning for a small pseudoaneurysm along the posterior margin of the distal right internal carotid artery with a small chronic appearing dissection flap. This measures 2 mm in   thickness (axial source CTA series 6 image 278 for example).    LEFT CAROTID: No measurable stenosis or dissection.    VERTEBRAL ARTERIES: No focal stenosis or dissection. Dominant left and developmentally diminutive right vertebral arteries.    AORTIC ARCH: Classic aortic arch anatomy with no significant stenosis at the origin of the great vessels.    NONVASCULAR STRUCTURES: 1.4 cm hyperattenuating left thyroid nodule. Mild multilevel cervical spondylosis. Included lung apices are clear.      Impression    IMPRESSION:   HEAD CT:  1.  Ovoid low-attenuation focus concerning for age-indeterminate small vessel infarct that may be acute to subacute in nature involving the lateral right thalamus and adjacent internal capsule. Head MRI could confirm the acuity of this finding.  2.  No hemorrhage, mass effect, or evidence for additional infarct.    HEAD CTA:   1.  No significant stenosis, aneurysm, or high flow vascular malformation identified.    NECK CTA:  1.  No hemodynamically significant stenosis involving the neck vessels by NASCET criteria.  2.  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 This pseudoaneurysm measures approximately 2   mm in thickness as above.  3.  No findings of acute dissection.   MR Brain w/o & w Contrast    Narrative    EXAM: MR BRAIN W/O and W CONTRAST  LOCATION: [facility]  DATE/TIME: 4/17/2022  7:16 PM    INDICATION: Neuro deficit, acute, stroke suspected  COMPARISON: None.  CONTRAST: 10 mL Gadavist  TECHNIQUE: Routine multiplanar multisequence head MRI without and with intravenous contrast.    FINDINGS:  INTRACRANIAL CONTENTS: There is a 2 x 1 cm diffusion hyperintense lesion (with corresponding ADC restriction) in the region of the right basal ganglia and genu of the internal capsule, consistent with an acute infarct. The lesion is mildly hyperintense   on FLAIR and T2 images. No mass, acute hemorrhage, or extra-axial fluid collections. Scattered nonspecific T2/FLAIR hyperintensities within the cerebral white matter most consistent with mild chronic microvascular ischemic change. Normal ventricles and   sulci. Normal position of the cerebellar tonsils. No pathologic contrast enhancement.    SELLA: No abnormality accounting for technique.    OSSEOUS STRUCTURES/SOFT TISSUES: Normal marrow signal. The major intracranial vascular flow voids are maintained.     ORBITS: Prior bilateral cataract surgery. Visualized portions of the orbits are otherwise unremarkable.     SINUSES/MASTOIDS: No paranasal sinus mucosal disease. No middle ear or mastoid effusion.       Impression    IMPRESSION:  1.  Findings consistent with an acute infarct right basal ganglia region.   MRA Brain ([place] of [place]) wo Contrast    Narrative    EXAM: MRA BRAIN ([place]) WO CONTRAST  LOCATION: [facility]  DATE/TIME: 4/17/2022 7:17 PM    INDICATION: Neuro deficit, acute, stroke suspected  COMPARISON: None.  TECHNIQUE: 3D time-of-flight head MRA without intravenous contrast.    FINDINGS:  ANTERIOR CIRCULATION: No stenosis/occlusion, aneurysm or high flow vascular malformation. Standard [place] anatomy.    POSTERIOR CIRCULATION: No stenosis/occlusion, aneurysm, or high flow vascular malformation. Dominant left and smaller right vertebral artery contribute to a normal basilar artery.        Impression    IMPRESSION:  1.  Normal MRA [place].   MRA Neck (Carotids) wo & w Contrast    Narrative    EXAM: MRA NECK (CAROTIDS) WO and W CONTRAST  LOCATION: [facility]  DATE/TIME: 4/17/2022 7:22 PM    INDICATION: Neuro deficit, acute, stroke suspected  COMPARISON: None.  CONTRAST: 10mL Gadavist  TECHNIQUE: Neck MRA without and with IV contrast. Stenosis measurements made according to NASCET criteria unless otherwise specified.    FINDINGS:  RIGHT CAROTID: No measurable stenosis or dissection.    LEFT CAROTID: No measurable stenosis or dissection.    VERTEBRAL ARTERIES: No focal stenosis or dissection. Balanced vertebral arteries.    AORTIC ARCH: Classic aortic arch anatomy with no significant stenosis at the origin of the great vessels.      Impression    IMPRESSION:  1.  Normal neck MRA.   CTA Chest with Contrast    Narrative    EXAM: CTA CHEST WITH CONTRAST  LOCATION: [facility]  DATE/TIME: 4/18/2022    INDICATION: Aortic disease, nontraumatic. Follow-up abnormal echocardiogram.  COMPARISON: MRA carotid 04/17/2022    TECHNIQUE: Multiphase helical acquisition through the chest, abdomen, and pelvis was performed including noncontrast, arterial phase, and delayed images before and after the administration of IV contrast. 2D and 3D reconstructions were performed by the   CT technologist. Dose reduction techniques were used.   CONTRAST: 80 mL Isovue 370    FINDINGS:  CT ANGIOGRAM CHEST: The sinotubular junction measures approximately 37 mm and the sinotubular junction 28 mm. There is fusiform dilation of the ascending aorta measuring 40 mm. Mild generalized plaque throughout nonaneurysmal arch and descending thoracic   aorta. No evidence of penetrating ulcer or dissection. The great arch vessels arise a normal fashion and are widely patent. Dominant left vertebral artery. The central pulmonary vasculature is unremarkable.    CHEST:  LUNGS AND PLEURA: Mild  biapical scarring, otherwise negative.    MEDIASTINUM: No mass or adenopathy.    CORONARY ARTERY CALCIFICATION: Mild.      Impression    IMPRESSION:  1.  Mild fusiform dilation ascending aorta measuring 40 mm. No evidence of dissection or penetrating ulcer.   Echocardiogram Complete - For age > 60 yrs     Value    LVEF  60-65%    Narrative    [id number]  [id number]  [id number]  [id number]^BARTHELL^SHY^JEANNE MONDRAGON     [facility]  Echocardiography Laboratory  [address] [place] [address]     Name: CYNTHIA FELDER  MRN: [id number]  : 1945  Study Date: 2022 01:11 PM  Age: 77 yrs  Gender: Female  Patient Location: [facility]  Reason For Study: Cerebrovascular Incident  Ordering Physician: BARTHELL, JOANNA KERSTEN ULMEN  Referring Physician: Jyoti Rose  Performed By: Eugenio Nunez RDCS     BSA: 1.7 m2  Height: 64 in  Weight: 150 lb  HR: 79  BP: 150/99 mmHg  ______________________________________________________________________________  Procedure  Complete Portable Echo Adult. Optison (NDC #0609-4562) given intravenously.  ______________________________________________________________________________  Interpretation Summary     Proximal septal thickening is noted.  The visual ejection fraction is 60-65%.  There is no thrombus seen in the left ventricle.  ABNORMAL ASC AORTA-sinus valsalva normal size. Asc aorta dilated to 42mm. On  the anterior side of asc aorta just above the sinotubular ridge there may be  an area of thickening. Cannot tell if this is artifact, penetrating aortic  ulcer/hematoma or less likely aortic tear. CT or ALLEN of this segment may be  useful especially in light of diagnosis of CVA. Report called to tiara Zhao  ______________________________________________________________________________  Left Ventricle  The left ventricle is normal in size. Proximal septal thickening is noted. The  visual ejection fraction is 60-65%. Grade I or early diastolic  dysfunction.  Normal left ventricular wall motion. There is no thrombus seen in the left  ventricle.     Right Ventricle  The right ventricle is normal in size and function.     Atria  The left atrium is borderline dilated. Right atrial size is normal. There is  no color Doppler evidence of a PFO.     Mitral Valve  There is trace mitral regurgitation.     Tricuspid Valve  There is trace to mild tricuspid regurgitation. The right ventricular systolic  pressure is approximated at 22.5 mmHg plus the right atrial pressure. Doppler  findings do not suggest pulmonary hypertension. IVC diameter <2.1 cm  collapsing >50% with sniff suggests a normal RA pressure of 3 mmHg.     Aortic Valve  The aortic valve is trileaflet. No aortic stenosis is present.     Pulmonic Valve  The pulmonic valve is not well seen, but is grossly normal.     Vessels  ABNORMAL ASC AORTA-sinus valsalva normal size. Asc aorta dilated to 42mm. On  the anterior side of asc aorta just above the sinotubular ridge there may be  an area of thickening. Cannot tell if this is artifact, penetrating aortic  ulcer/hematoma or less likely aortic tear. CT or ALLEN of this segment may be  useful especially in light of diagnosis of CVA. Report called to tiara Zhao.  The ascending aorta is Mildly dilated. The inferior vena cava is normal.     Pericardium  The pericardium appears normal.     Rhythm  Sinus rhythm was noted.  ______________________________________________________________________________  MMode/2D Measurements & Calculations  IVSd: 1.2 cm     LVIDd: 3.8 cm  LVIDs: 3.2 cm  LVPWd: 1.1 cm  FS: 15.3 %  LV mass(C)d: 142.1 grams  LV mass(C)dI: 82.1 grams/m2  Ao root diam: 3.3 cm  LA dimension: 2.8 cm  asc Aorta Diam: 4.2 cm  LA/Ao: 0.83  LA Volume (BP): 34.4 ml  LA Volume Index (BP): 19.9 ml/m2  RWT: 0.56     Doppler Measurements & Calculations  MV E max leeann: 63.4 cm/sec  MV A max leeann: 102.4 cm/sec  MV E/A: 0.62  MV dec slope: 225.1 cm/sec2  PA acc time: 0.12  sec  TR max leeann: 237.2 cm/sec  TR max P.5 mmHg  E/E' av.4  Lateral E/e': 11.6  Medial E/e': 13.2     ______________________________________________________________________________  Report approved by: Siddharth Wilkinson 2022 04:44 PM                 Most Recent Lab Results In EPIC (For Non-EPIC Providers):    Most Recent 3 CBC's:  Recent Labs   Lab Test 22  0841 22  1615   WBC 5.9 7.0   HGB 12.7 13.6   MCV 93 92    191      Most Recent 3 BMP's:  Recent Labs   Lab Test 22  0737 22  2103 22  1624 22  1205 22  0841 22  1615   NA  --   --   --   --  141 141   POTASSIUM  --   --   --   --  3.5 3.8   CHLORIDE  --   --   --   --  111* 112*   CO2  --   --   --   --  25 26   BUN  --   --   --   --  12 15   CR  --   --   --   --  0.68 0.70   ANIONGAP  --   --   --   --  5 3   JOSESITO  --   --   --   --  9.4 9.3   GLC 94 125* 94   < > 116* 119*    < > = values in this interval not displayed.     Most Recent 3 Troponin's:No lab results found.    Invalid input(s): TROP, TROPONINIES  Most Recent 3 INR's:  Recent Labs   Lab Test 22  1615   INR 0.96     Most Recent 2 LFT's:  Recent Labs   Lab Test 22  1615   AST 25   ALT 36   ALKPHOS 84   BILITOTAL 0.2     Most Recent Cholesterol Panel:  Recent Labs   Lab Test 22  1615   CHOL 243*   *   HDL 55   TRIG 207*     Most Recent 6 Bacteria Isolates From Any Culture (See EPIC Reports for Culture Details):No lab results found.  Most Recent TSH, T4 and HgbA1c:   Recent Labs   Lab Test 22  1615   A1C 5.6

## 2022-04-19 NOTE — PLAN OF CARE
Goal Outcome Evaluation:      Reason for Admission: R Basal Ganglia Stroke.     Cognitive/Mentation: A/OX4  Neuros/CMS: Intact ex Initial symptoms resolved. No noted deficits  VS: VSS on RA.   Tele: SR  GI: BS Active. Continent.  : WNL. Continent.  Pulmonary: LS clear  Pain: Denies     Drains/Lines: Infusing NS at 75 ml / hr   Skin: Intact  Activity: Independent  Diet: Regular with thin liquids.    Therapies recs: Home with no therapy.   Discharge: Today after Neuro signs off.     Aggression Stoplight Tool: Green

## 2022-04-19 NOTE — CONSULTS
Stroke Education Note    The following information has been reviewed with the patient and family:    1. Warning signs of stroke    2. Calling 911 if having warning signs of stroke    3. All modifiable risk factors: hypertension, CAD, atrial fib, diabetes, hypercholesterolemia, smoking, substance abuse, diet, physical inactivity, obesity, sleep apnea.    4. Patient's risk factors for stroke which include: HLD, HTN    5. Follow-up plan for after discharge    6. Discharge medications which include: ASA, simvastatin, amlodipine    In addition, the above information was given to the patient and family in writing as a part of the [facility] Stroke Class Handout.    Learner's response to risk factors / lifestyle modification education: Committment to change     Indira Camarena RN

## 2022-04-19 NOTE — CONSULTS
[facility]    Stroke Progress Note    Interval Events  No acute events overnigth    HPI Summary    Lamont Olsen is a 77 year old female hx HLD has not being able to tolerate multiple statins due to muscle pain, comes in with 3 days of difficulty with balance, ataxi gait leaning L, some word finding difficulty and speech non-fluency. CTH shows R BG acute/subacute infarct.    MRI/Head CT MRI brain acute vs subacute R BG infarct   Intracranial Vasculature HEAD CTA:   1.  No significant stenosis, aneurysm, or high flow vascular malformation identified.     MRA brain normal   Cervical Vasculature NECK CTA:  1.  No hemodynamically significant stenosis involving the neck vessels by NASCET criteria.  2.  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 This pseudoaneurysm measures approximately 2   mm in thickness as above.  3.  No findings of acute dissection     MRA neck - normal      Echocardiogram EF 60-65% LA borderline dilated no thrombus      Asc aorta dilated to 42mm. On the anterior side of asc aorta just above the sinotubular ridge there may be  an area of thickening. Cannot tell if this is artifact, penetrating aortic ulcer/hematoma or less likely aortic tear. CT or ALLEN of this segment may be useful especially in light of diagnosis of CVA.   EKG/Telemetry SR   Other Testing CTA chest - Mild fusiform dilation ascending aorta measuring 40 mm. No evidence of dissection or penetrating ulcer.      LDL  4/17/2022: 147 mg/dL   A1C  4/17/2022: 5.6 %   Troponin No lab value available in past 48 hrs     Impression     78yo woman with RBG infarct in setting of HLD, most likely small vessel disease, will need aggressive bp and cholesterol management, has not tolerated statins in past started on low dose statin and see if tolerates with  "later up titration. New afib lower on differential but should leave on cardiac monitor. There was concern for aortic tear on TTE but CTA chest only shows dilation.    Plan    - Neurochecks and Vital Signs every q4h   - Permissive HTN; goal SBP < 180 mmHg op goal <130mmHg  - Daily aspirin 81 mg for secondary stroke prevention  - Statin: Simvastatin 10mg at bedtime op goal LDL <70 - should be gradually uptitrated by PCP if tolerates  - Telemetry, EKG  - Bedside Glucose Monitoring  - PT/OT/SLP  - Stroke Education  - Euthermia, Euglycemia    - Follow-up N clinic 6-8 weeks  - Cardiac monitor at DC    Patient Follow-up    -No further recs stroke signing off, please call with any questions    The Stroke Staff is Dr. Liban Kruse MD  Vascular Neurology Fellow  To page me or covering stroke neurology team member, click here: AMCOM   Choose \"On Call\" tab at top, then search dropdown box for \"Neurology Adult\", select location, press Enter, then look for stroke/neuro ICU/telestroke.    ______________________________________________________    Clinically Significant Risk Factors Present on Admission                 Medications   Scheduled Meds    aspirin  81 mg Oral Daily    Or     aspirin  81 mg Oral or NG Tube Daily     latanoprost  1 drop Right Eye QPM     simvastatin  10 mg Oral At Bedtime     sodium chloride (PF)  3 mL Intracatheter Q8H       Infusion Meds    - MEDICATION INSTRUCTIONS -       - MEDICATION INSTRUCTIONS -       sodium chloride         PRN Meds  acetaminophen, labetalol **OR** hydrALAZINE, lidocaine 4%, lidocaine (buffered or not buffered), - MEDICATION INSTRUCTIONS -, - MEDICATION INSTRUCTIONS -, melatonin, ondansetron **OR** ondansetron, prochlorperazine **OR** prochlorperazine **OR** prochlorperazine, sodium chloride (PF), sodium chloride       PHYSICAL EXAMINATION  Temp:  [97.5  F (36.4  C)-98.3  F (36.8  C)] 98.2  F (36.8  C)  Pulse:  [78-88] 78  Resp:  [16-18] 18  BP: (128-145)/(80-99) " 128/80  SpO2:  [93 %-97 %] 93 %      Neurologic  Mental Status:  alert, oriented x 3, follows commands, speech clear and fluent, naming and repetition normal  Cranial Nerves:  visual fields intact, PERRL, EOMI with normal smooth pursuit, facial sensation intact and symmetric, facial movements symmetric, hearing not formally tested but intact to conversation  Motor:  normal muscle tone and bulk, no abnormal movements, able to move all limbs spontaneously, strength 5/5 throughout upper and lower extremities, no pronator drift  Reflexes:  toes down-going  Sensory:  light touch sensation intact and symmetric throughout upper and lower extremities, no extinction on double simultaneous stimulation   Coordination:  normal finger-to-nose and heel-to-shin bilaterally without dysmetria, rapid alternating movements symmetric  Station/Gait:  deferred    Stroke Scales    NIHSS  1a. Level of Consciousness 0-->Alert, keenly responsive   1b. LOC Questions 0-->Answers both questions correctly   1c. LOC Commands 0-->Performs both tasks correctly   2.   Best Gaze 0-->Normal   3.   Visual 0-->No visual loss   4.   Facial Palsy 0-->Normal symmetrical movements   5a. Motor Arm, Left 0-->No drift, limb holds 90 (or 45) degrees for full 10 secs   5b. Motor Arm, Right 0-->No drift, limb holds 90 (or 45) degrees for full 10 secs   6a. Motor Leg, Left 0-->No drift, leg holds 30 degree position for full 5 secs   6b. Motor Leg, right 0-->No drift, leg holds 30 degree position for full 5 secs   7.   Limb Ataxia 0-->Absent   8.   Sensory 0-->Normal, no sensory loss   9.   Best Language 0-->No aphasia, normal   10. Dysarthria 0-->Normal   11. Extinction and Inattention  0-->No abnormality   Total 0 (04/19/22 1210)       mRS 0    Imaging  I personally reviewed all imaging; relevant findings per HPI.     Lab Results Data   CBC  Recent Labs   Lab 04/18/22  0841 04/17/22  1615   WBC 5.9 7.0   RBC 4.22 4.53   HGB 12.7 13.6   HCT 39.1 41.7    191      Basic Metabolic Panel    Recent Labs   Lab 04/19/22  0737 04/18/22  2103 04/18/22  1624 04/18/22  1205 04/18/22  0841 04/17/22  1615   NA  --   --   --   --  141 141   POTASSIUM  --   --   --   --  3.5 3.8   CHLORIDE  --   --   --   --  111* 112*   CO2  --   --   --   --  25 26   BUN  --   --   --   --  12 15   CR  --   --   --   --  0.68 0.70   GLC 94 125* 94   < > 116* 119*   JOSESITO  --   --   --   --  9.4 9.3    < > = values in this interval not displayed.     Liver Panel  Recent Labs   Lab 04/17/22  1615   PROTTOTAL 7.5   ALBUMIN 4.1   BILITOTAL 0.2   ALKPHOS 84   AST 25   ALT 36     INR    Recent Labs   Lab Test 04/17/22  1615   INR 0.96      Lipid Profile    Recent Labs   Lab Test 04/17/22  1615   CHOL 243*   HDL 55   *   TRIG 207*     A1C    Recent Labs   Lab Test 04/17/22  1615   A1C 5.6     Troponin I    Recent Labs   Lab 04/17/22  1615   TROPONINIS <3

## 2022-04-20 ENCOUNTER — PATIENT OUTREACH (OUTPATIENT)
Dept: CARE COORDINATION | Facility: CLINIC | Age: 77
End: 2022-04-20
Payer: COMMERCIAL

## 2022-04-20 DIAGNOSIS — Z71.89 OTHER SPECIFIED COUNSELING: ICD-10-CM

## 2022-04-20 LAB
ATRIAL RATE - MUSE: 73 BPM
DIASTOLIC BLOOD PRESSURE - MUSE: NORMAL MMHG
INTERPRETATION ECG - MUSE: NORMAL
P AXIS - MUSE: 30 DEGREES
PR INTERVAL - MUSE: 154 MS
QRS DURATION - MUSE: 92 MS
QT - MUSE: 400 MS
QTC - MUSE: 440 MS
R AXIS - MUSE: 47 DEGREES
SYSTOLIC BLOOD PRESSURE - MUSE: NORMAL MMHG
T AXIS - MUSE: 57 DEGREES
VENTRICULAR RATE- MUSE: 73 BPM

## 2022-04-20 NOTE — PROGRESS NOTES
Clinic Care Coordination Contact  [facility]/Voicemail       Clinical Data: Care Coordinator Outreach  Outreach attempted x 1.  Left message on patient's voicemail with call back information and requested return call.  Plan: Care Coordinator will try to reach patient again in 1-2 business days.    .Odette VALENTINO Community Health Worker  Clinic Care Coordination  [facility]  Phone: [phone]

## 2022-04-21 NOTE — PROGRESS NOTES
Clinic Care Coordination Contact  [facility]/Voicemail       Clinical Data: Care Coordinator Outreach  Outreach attempted x 2.  Left message on patient's voicemail with call back information and requested return call.  Plan:  Care Coordinator will do no further outreaches at this time.    Odette VALENTINO Community Health Worker  Clinic Care Coordination  [facility]  Phone: [phone]

## 2022-04-27 ENCOUNTER — APPOINTMENT (OUTPATIENT)
Dept: URBAN - METROPOLITAN AREA CLINIC 253 | Age: 77
Setting detail: DERMATOLOGY
End: 2022-04-29

## 2022-04-27 VITALS — WEIGHT: 145 LBS | RESPIRATION RATE: 14 BRPM | HEIGHT: 64 IN

## 2022-04-27 DIAGNOSIS — Z71.89 OTHER SPECIFIED COUNSELING: ICD-10-CM

## 2022-04-27 DIAGNOSIS — D18.0 HEMANGIOMA: ICD-10-CM

## 2022-04-27 DIAGNOSIS — L81.4 OTHER MELANIN HYPERPIGMENTATION: ICD-10-CM

## 2022-04-27 DIAGNOSIS — L82.1 OTHER SEBORRHEIC KERATOSIS: ICD-10-CM

## 2022-04-27 DIAGNOSIS — D22 MELANOCYTIC NEVI: ICD-10-CM

## 2022-04-27 DIAGNOSIS — L82.0 INFLAMED SEBORRHEIC KERATOSIS: ICD-10-CM

## 2022-04-27 DIAGNOSIS — D49.2 NEOPLASM OF UNSPECIFIED BEHAVIOR OF BONE, SOFT TISSUE, AND SKIN: ICD-10-CM

## 2022-04-27 DIAGNOSIS — L57.0 ACTINIC KERATOSIS: ICD-10-CM

## 2022-04-27 PROBLEM — D22.5 MELANOCYTIC NEVI OF TRUNK: Status: ACTIVE | Noted: 2022-04-27

## 2022-04-27 PROBLEM — D18.01 HEMANGIOMA OF SKIN AND SUBCUTANEOUS TISSUE: Status: ACTIVE | Noted: 2022-04-27

## 2022-04-27 PROCEDURE — 11102 TANGNTL BX SKIN SINGLE LES: CPT | Mod: 59

## 2022-04-27 PROCEDURE — OTHER BIOPSY BY SHAVE METHOD: OTHER

## 2022-04-27 PROCEDURE — 17000 DESTRUCT PREMALG LESION: CPT | Mod: 59

## 2022-04-27 PROCEDURE — OTHER MIPS QUALITY: OTHER

## 2022-04-27 PROCEDURE — 99213 OFFICE O/P EST LOW 20 MIN: CPT | Mod: 25

## 2022-04-27 PROCEDURE — OTHER COUNSELING: OTHER

## 2022-04-27 PROCEDURE — 11103 TANGNTL BX SKIN EA SEP/ADDL: CPT | Mod: 59

## 2022-04-27 PROCEDURE — 17110 DESTRUCT B9 LESION 1-14: CPT

## 2022-04-27 PROCEDURE — OTHER LIQUID NITROGEN: OTHER

## 2022-04-27 ASSESSMENT — LOCATION SIMPLE DESCRIPTION DERM
LOCATION SIMPLE: RIGHT UPPER BACK
LOCATION SIMPLE: UPPER BACK
LOCATION SIMPLE: RIGHT TEMPLE
LOCATION SIMPLE: LEFT LOWER BACK
LOCATION SIMPLE: LEFT PRETIBIAL REGION

## 2022-04-27 ASSESSMENT — LOCATION ZONE DERM
LOCATION ZONE: TRUNK
LOCATION ZONE: FACE
LOCATION ZONE: LEG

## 2022-04-27 ASSESSMENT — LOCATION DETAILED DESCRIPTION DERM
LOCATION DETAILED: RIGHT SUPERIOR MEDIAL UPPER BACK
LOCATION DETAILED: RIGHT LATERAL TEMPLE
LOCATION DETAILED: LEFT PROXIMAL PRETIBIAL REGION
LOCATION DETAILED: INFERIOR THORACIC SPINE
LOCATION DETAILED: LEFT SUPERIOR MEDIAL MIDBACK

## 2022-04-27 NOTE — PROCEDURE: BIOPSY BY SHAVE METHOD
Hide Topical Anesthesia?: No
Depth Of Biopsy: dermis
Biopsy Type: H and E
Notification Instructions: Patient will be notified of biopsy results. However, patient instructed to call the office if not contacted within 2 weeks.
Consent: Written consent was obtained and risks were reviewed including but not limited to scarring, infection, bleeding, scabbing, incomplete removal, nerve damage and allergy to anesthesia.
Dressing: bandage
Anesthesia Type: 2% lidocaine with epinephrine and a 1:10 solution of 8.4% sodium bicarbonate
Electrodesiccation And Curettage Text: The wound bed was treated with electrodesiccation and curettage after the biopsy was performed.
X Size Of Lesion In Cm: 0
Type Of Destruction Used: Curettage
Biopsy Method: Dermablade
Post-Care Instructions: I reviewed with the patient in detail post-care instructions. Patient is to keep the biopsy site dry overnight, and then apply bacitracin twice daily until healed. Patient may apply hydrogen peroxide soaks to remove any crusting.
Billing Type: Third-Party Bill
Render Post-Care Instructions In Note?: yes
Cryotherapy Text: The wound bed was treated with cryotherapy after the biopsy was performed.
Wound Care: Petrolatum
Information: Selecting Yes will display possible errors in your note based on the variables you have selected. This validation is only offered as a suggestion for you. PLEASE NOTE THAT THE VALIDATION TEXT WILL BE REMOVED WHEN YOU FINALIZE YOUR NOTE. IF YOU WANT TO FAX A PRELIMINARY NOTE YOU WILL NEED TO TOGGLE THIS TO 'NO' IF YOU DO NOT WANT IT IN YOUR FAXED NOTE.
Detail Level: Detailed
Silver Nitrate Text: The wound bed was treated with silver nitrate after the biopsy was performed.
Hemostasis: Drysol
Electrodesiccation Text: The wound bed was treated with electrodesiccation after the biopsy was performed.
Anesthesia Volume In Cc (Will Not Render If 0): 0.3
Curettage Text: The wound bed was treated with curettage after the biopsy was performed.

## 2022-04-27 NOTE — PROCEDURE: LIQUID NITROGEN
Consent: The patient's consent was obtained including but not limited to risks of crusting, scabbing, blistering, scarring, darker or lighter pigmentary change, recurrence, incomplete removal and infection.
Show Aperture Variable?: Yes
Render Note In Bullet Format When Appropriate: No
Duration Of Freeze Thaw-Cycle (Seconds): 2
Medical Necessity Information: It is in your best interest to select a reason for this procedure from the list below. All of these items fulfill various CMS LCD requirements except the new and changing color options.
Post-Care Instructions: I reviewed with the patient in detail post-care instructions. Patient is to wear sunprotection, and avoid picking at any of the treated lesions. Pt may apply Vaseline to crusted or scabbing areas.
Number Of Freeze-Thaw Cycles: 3 freeze-thaw cycles
Spray Paint Text: The liquid nitrogen was applied to the skin utilizing a spray paint frosting technique.
Detail Level: Detailed
Medical Necessity Clause: This procedure was medically necessary because the lesions that were treated were:

## 2022-04-27 NOTE — HPI: FULL BODY SKIN EXAMINATION
How Severe Are Your Spot(S)?: mild
What Type Of Note Output Would You Prefer (Optional)?: Standard Output
What Is The Reason For Today's Visit?: Full Body Skin Examination
What Is The Reason For Today's Visit? (Being Monitored For X): the risk of recurrence of previously treated lesion(s)
Additional History: She had a stroke around [facility], but is doing well.

## 2022-05-05 ENCOUNTER — HOSPITAL ENCOUNTER (EMERGENCY)
Facility: CLINIC | Age: 77
Discharge: HOME | End: 2022-05-05
Attending: EMERGENCY MEDICINE | Admitting: EMERGENCY MEDICINE
Payer: COMMERCIAL

## 2022-05-05 ENCOUNTER — APPOINTMENT (OUTPATIENT)
Dept: MRI IMAGING | Facility: CLINIC | Age: 77
End: 2022-05-05
Attending: EMERGENCY MEDICINE
Payer: COMMERCIAL

## 2022-05-05 VITALS
BODY MASS INDEX: 23.9 KG/M2 | OXYGEN SATURATION: 99 % | RESPIRATION RATE: 20 BRPM | DIASTOLIC BLOOD PRESSURE: 87 MMHG | HEIGHT: 64 IN | WEIGHT: 140 LBS | TEMPERATURE: 98.2 F | HEART RATE: 95 BPM | SYSTOLIC BLOOD PRESSURE: 134 MMHG

## 2022-05-05 DIAGNOSIS — M48.02 SPINAL STENOSIS OF CERVICAL REGION: ICD-10-CM

## 2022-05-05 PROCEDURE — 250N000011 HC RX IP 250 OP 636: Performed by: EMERGENCY MEDICINE

## 2022-05-05 PROCEDURE — 96374 THER/PROPH/DIAG INJ IV PUSH: CPT

## 2022-05-05 PROCEDURE — 72141 MRI NECK SPINE W/O DYE: CPT

## 2022-05-05 PROCEDURE — 99285 EMERGENCY DEPT VISIT HI MDM: CPT | Mod: 25

## 2022-05-05 PROCEDURE — 96375 TX/PRO/DX INJ NEW DRUG ADDON: CPT

## 2022-05-05 RX ORDER — KETOROLAC TROMETHAMINE 15 MG/ML
15 INJECTION, SOLUTION INTRAMUSCULAR; INTRAVENOUS ONCE
Status: COMPLETED | OUTPATIENT
Start: 2022-05-05 | End: 2022-05-05

## 2022-05-05 RX ORDER — GABAPENTIN 100 MG/1
100 CAPSULE ORAL 3 TIMES DAILY
Qty: 270 CAPSULE | Refills: 0 | Status: SHIPPED | OUTPATIENT
Start: 2022-05-05 | End: 2022-08-03

## 2022-05-05 RX ORDER — DEXAMETHASONE 4 MG/1
4 TABLET ORAL 3 TIMES DAILY
Qty: 15 TABLET | Refills: 0 | Status: SHIPPED | OUTPATIENT
Start: 2022-05-05 | End: 2022-05-10

## 2022-05-05 RX ORDER — LORAZEPAM 2 MG/ML
0.5 INJECTION INTRAMUSCULAR ONCE
Status: COMPLETED | OUTPATIENT
Start: 2022-05-05 | End: 2022-05-05

## 2022-05-05 RX ORDER — HYDROMORPHONE HYDROCHLORIDE 1 MG/ML
0.3 INJECTION, SOLUTION INTRAMUSCULAR; INTRAVENOUS; SUBCUTANEOUS ONCE
Status: COMPLETED | OUTPATIENT
Start: 2022-05-05 | End: 2022-05-05

## 2022-05-05 RX ADMIN — HYDROMORPHONE HYDROCHLORIDE 0.3 MG: 1 INJECTION, SOLUTION INTRAMUSCULAR; INTRAVENOUS; SUBCUTANEOUS at 12:01

## 2022-05-05 RX ADMIN — LORAZEPAM 0.5 MG: 2 INJECTION INTRAMUSCULAR; INTRAVENOUS at 12:26

## 2022-05-05 RX ADMIN — KETOROLAC TROMETHAMINE 15 MG: 15 INJECTION, SOLUTION INTRAMUSCULAR; INTRAVENOUS at 12:01

## 2022-05-05 ASSESSMENT — ENCOUNTER SYMPTOMS
NAUSEA: 1
HEADACHES: 1
NECK PAIN: 1
SPEECH DIFFICULTY: 1
MYALGIAS: 0
FATIGUE: 1

## 2022-05-05 NOTE — DISCHARGE INSTRUCTIONS
Take dexamethasone as directed.  Take gabapentin as directed.  Make an appointment to follow-up with neurological surgery for a consult.

## 2022-05-05 NOTE — ED PROVIDER NOTES
"  History   Chief Complaint:  Neck Pain       The history is provided by the patient.      Lamont Olsen is a 77 year old female with history of stroke, osteoarthritis, basal cell carcinoma, and hyperlipidemia who presents with neck pain. The patient reports that she began experiencing right-sided neck pain radiating to the right shoulder yesterday, worsening today. Per chart review, the patient was reportedly \"leaning\" to the left and having trouble walking. She also reportedly had speech changes for the past couple days and elevated blood pressure. The patient endorses feeling \"tired all the time.\" She reports that she has been sleeping and sitting more than usual in a chair for the past 2 weeks which she thinks caused her neck pain. She endorses headache and nausea. Her blood pressure was reportedly 140/101 at home this morning. The patient denies use of painkillers or muscle relaxants. She endorses taking 2.5 mg of norvasc today. She also notes being on baby aspirin and 10 mg of simvastatin. Of note, the patient was admitted for a right acute basal ganglia infarction on 04/17/22 and discharged on 04/19/22 (imaging is shown below). At this time, she denies pain in arms.     CTA HEAD NECK WITH CONTRAST- [facility]- 4/17/22   IMPRESSION:   HEAD CT:   1.  Ovoid low-attenuation focus concerning for age-indeterminate small vessel infarct that may be  acute to subacute in nature involving the lateral right thalamus and adjacent internal capsule. Head  MRI could confirm the acuity of this finding.   2.  No hemorrhage, mass effect, or evidence for additional infarct.      HEAD CTA:    1.  No significant stenosis, aneurysm, or high flow vascular malformation identified.      NECK CTA:   1.  No hemodynamically significant stenosis involving the neck vessels by NASCET criteria.   2.  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 This pseudoaneurysm measures approximately 2 mm in thickness as above.   3.  No findings of acute dissection.    MRI- [facility]- 4/17/22   IMPRESSION:   1.  Findings consistent with an acute infarct right basal ganglia region     MRA NECK- [facility]- 4/17/22   IMPRESSION:   1.  Normal neck MRA.    MRA BRAIN- [facility] 4/17/22   IMPRESSION:   1.  Normal MRA [place].    CTA Chest with Contrast- [facility]- 4/18/22   IMPRESSION:   1.  Mild fusiform dilation ascending aorta measuring 40 mm. No evidence of dissection or penetrating  ulcer.    Review of Systems   Constitutional: Positive for fatigue.   Gastrointestinal: Positive for nausea.   Musculoskeletal: Positive for gait problem and neck pain (R side). Negative for myalgias (arms).   Neurological: Positive for speech difficulty and headaches.   All other systems reviewed and are negative.      Allergies:  Codeine    Medications:  Decadron  Neurontin  Norvasc  Aspirin  Zocor    Past Medical History:     Hyperlipidemia  Basal cell carcinoma  Osteoarthritis  Non-allopathic lesion of cervical and thoracic region  Stroke  Cervicalgia  C. Diff  Uterine cancer (possible)    Past Surgical History:    Bilateral knee arthroscopy  Ovarian cystadenoma removal  CARLA & BSO  Low back orthopedic procedure  Skin cancer excision  Appendectomy  Breast lumpectomy  Right cataract removal  Incision on finger    Family History:    Father: CAD, MI  Brother: hyperlipidemia  Sister: hyperlipidemia    Social History:  Presents to the emergency department with her    Arrives via car    Physical Exam     Patient Vitals for the past 24 hrs:   BP Temp Temp src Pulse Resp SpO2 Height Weight   05/05/22 0917 134/87 98.2  F (36.8  C) Oral 95 20 99 % 1.626 m (5' 4\") 63.5 kg (140 lb)       Physical Exam    General: Resting uncomfortably on the gurney, she is rubbing/massaging " the right side of her neck  Head:  The scalp, face, and head appear normal  Eyes:  The pupils are equal, round, and reactive to light    There is no nystagmus    Extraocular muscles are intact    Conjunctivae and sclerae are normal  ENT:    The nose is normal    Pinnae are normal    The oropharynx is normal    Uvula is in the midline  Neck:  Normal range of motion    There is no rigidity noted    There is no midline cervical spine pain/tenderness    There is right paracervical mid cervical spine tenderness to palpation. This pressure in this   area causes the pain to refer superiorly and inferiorly.     Trachea is in the midline    No mass is detected  CV:  Regular rate and underlying rhythm     Normal S1/S2, no S3/S4    No pathological murmur detected  Resp:  Lungs are clear    There is no tachypnea    Non-labored    No rales    No wheezing   MS:  Normal muscular tone    Symmetric motor strength    No major joint effusions    No asymmetric leg swelling, no calf tenderness    Right arm: normal strength to the deltoid, biceps, triceps, wrist extenders, and intrinsic   muscles of the hands. 2+ triceps and biceps reflexes.   Skin:  No rash or acute skin lesions noted  Neuro: Speech is normal and fluent    Right upper extremity exam is normal, there is mild referred pain from the neck to the   acromium region  Psych:  Awake. Alert.      Normal affect.  Appropriate interactions.  Lymph: No anterior cervical lymphadenopathy noted    Emergency Department Course     Imaging:  Cervical spine MRI w/o contrast   Final Result   IMPRESSION:   1.  Severe C6-C7 spinal canal stenosis with mild to moderate C5-C6 and   C7-T1 spinal canal stenosis. More mild spinal canal stenosis   elsewhere.      2.  Moderate to severe bilateral C5-C6 and C6-C7 neural foraminal   stenosis. Severe right C3-C4 neural foraminal stenosis. Additional   areas of moderate and mild-to-moderate neuroforaminal stenosis as   detailed above.      3.  Moderate to  severe C6-C7 and moderate at C5-C6 degenerative disc   disease with multilevel mild degenerative disc disease elsewhere.      4.  Multilevel facet arthropathy as above.      MELQUIADES LYNN MD            SYSTEM ID:  [id number]        Report per radiology    Emergency Department Course:           Reviewed:  I reviewed nursing notes, vitals, past medical history and Care Everywhere    Assessments:  1058 I obtained history and examined the patient as noted above.   1403 I rechecked the patient and explained findings. She is feeling better and eating. I prepared the patient for  discharge.    Interventions:  1201 Dilaudid 0.3 mg  1201 Toradol 15 mg IV  1226 Ativan 0.5 mg IV    Disposition:  The patient was discharged to home.     Impression & Plan     Medical Decision Making:  This patient presents to the emergency department with neck pain as noted above.  She has had some intermittent mild neck pain in the past however the pain yesterday and today has been more intense.  The patient is normally active but has been much less active recently and been doing a lot of reading with her neck somewhat flexed.  The patient has had right greater than left mid to low cervical spine discomfort with some mild vague radiation of the pain into the right trapezial ridge and acromial area.  She does not have dermatomal pain or radicular pain going deeper into the arm or out distally.  She has no weakness or areflexia.  MRI of the cervical spine was obtained which does show multilevel cervical spinal canal stenosis, multiple levels with foraminal narrowing and facet arthropathy, and degenerative disc disease.  There is no evidence of marked herniated disc or myelopathy.  Cervical radiculopathy is still in the differential diagnosis.  Patient will be started on low-dose gabapentin and also dexamethasone.  She will be referred to neurosurgery for consultation and further management.  No life-threatening etiologies are detected at this  time.  There is no epidural abscess.  No evidence of malignancy or metastasis.  No evidence of significant effacement or injury to the cord at this time.  The patient's had extensive vascular imaging of the neck with CT and MRA in the very recent past, this is unlikely to represent an acute vertebral dissection or other vascular process.    Critical Care Time: none    Diagnosis:    ICD-10-CM    1. Spinal stenosis of cervical region  M48.02        Discharge Medications:  Discharge Medication List as of 5/5/2022  2:28 PM      START taking these medications    Details   dexamethasone (DECADRON) 4 MG tablet Take 1 tablet (4 mg) by mouth 3 times daily for 5 days, Disp-15 tablet, R-0, E-Prescribe      gabapentin (NEURONTIN) 100 MG capsule Take 1 capsule (100 mg) by mouth 3 times daily, Disp-270 capsule, R-0, E-Prescribe             Scribe Disclosure:  Tyler RIVERA, am serving as a scribe at 10:54 AM on 5/5/2022 to document services personally performed by Clarence Mcintyre MD based on my observations and the provider's statements to me.            Clarence Mcintyre MD  05/05/22 2562

## 2022-05-05 NOTE — ED TRIAGE NOTES
Pt sts she has had neck pain for 2 days     Triage Assessment     Row Name 05/05/22 0918       Triage Assessment (Adult)    Airway WDL WDL       Respiratory WDL    Respiratory WDL WDL       Skin Circulation/Temperature WDL    Skin Circulation/Temperature WDL WDL       Cardiac WDL    Cardiac WDL WDL       Peripheral/Neurovascular WDL    Peripheral Neurovascular WDL WDL       Cognitive/Neuro/Behavioral WDL    Cognitive/Neuro/Behavioral WDL WDL

## 2022-05-12 NOTE — PROCEDURE: EXCISION
Follows with therapist at [facility].  Currently asymptomatic; however, patient is very somnolent.    Retention Suture Text: Retention sutures were placed to support the closure and prevent dehiscence.

## 2022-08-01 NOTE — CONSULTS
[facility]    Stroke Telephone Note    I was called by Jasiel Hernández on 04/17/22 regarding patient Lamont Olsen. The patient is a 77 year old female  has a past medical history of Hyperlipidemia, Malignant neoplasm (H) (age 45), and Osteoarthritis.     Patient presents with a subacute course (since Friday) of difficulty with balance and difficulty speaking. Currently in the ED patient is notably asymptomatic. She presented at the urging of family members.     Imaging Findings   HEAD CT:  1.  Ovoid low-attenuation focus concerning for age-indeterminate small vessel infarct that may be acute to subacute in nature involving the lateral right thalamus and adjacent internal capsule. Head MRI could confirm the acuity of this finding.  2.  No hemorrhage, mass effect, or evidence for additional infarct.     HEAD CTA:   1.  No significant stenosis, aneurysm, or high flow vascular malformation identified.     NECK CTA:  1.  No hemodynamically significant stenosis involving the neck vessels by NASCET criteria.  2.  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 This pseudoaneurysm measures approximately 2   mm in thickness as above.  3.  No findings of acute dissection.    Impression  Acute ischemic stroke of Right anterior lateral thalamus near R internal capsule.  due to undetermined etiology ---   Possibly due to anterior choroidal artery stroke-- Anterior choroidal artery is a branch of the internal carotid artery so it may be possibly be related to noted disscetion. Should obtain MRA neck with T1 FAT SAT (Disscetion protocol) to better visualize dissection as may .     Patient with difficulty walking and with balance may represent a possibly ataxic hemiparesis.     If Dissection seems chronic on would  No new retinal detachment or retinal tear noted. "preferentially treat patient with antiplatelet agents; however, if dissection may be the source, would prefer possible short-term course of anticoagulation.    Furthermore on evaluation of CTA, right PCA seems to deviate anterior and may also be a potential source for this location of stroke.      Recommendations   > MRI brain with and without contrast  > MRA brain, [place]  > MRA neck with contrast and with T1 FAT SAT    Please page Stroke team on call when imaging complete for further recommendations.       My recommendations are based on the information provided over the phone by Lamont Olsen's in-person providers. They are not intended to replace the clinical judgment of her in-person providers. I was not requested to personally see or examine the patient at this time.    The Stroke Staff is Dr. White.    Clarence Elaine MD  Vascular Neurology Fellow  To page me or covering stroke neurology team member, click here: [facility]   Choose \"On Call\" tab at top, then search dropdown box for \"Neurology Adult\", select location, press Enter, then look for stroke/neuro ICU/telestroke.           "

## 2022-09-12 ENCOUNTER — APPOINTMENT (OUTPATIENT)
Dept: URBAN - METROPOLITAN AREA CLINIC 253 | Age: 77
Setting detail: DERMATOLOGY
End: 2022-09-14

## 2022-09-12 VITALS — HEIGHT: 64 IN | WEIGHT: 148 LBS | RESPIRATION RATE: 14 BRPM

## 2022-09-12 DIAGNOSIS — D22 MELANOCYTIC NEVI: ICD-10-CM

## 2022-09-12 PROBLEM — D22.5 MELANOCYTIC NEVI OF TRUNK: Status: ACTIVE | Noted: 2022-09-12

## 2022-09-12 PROCEDURE — 11102 TANGNTL BX SKIN SINGLE LES: CPT

## 2022-09-12 PROCEDURE — OTHER BIOPSY BY SHAVE METHOD: OTHER

## 2022-09-12 ASSESSMENT — LOCATION ZONE DERM: LOCATION ZONE: TRUNK

## 2022-09-12 ASSESSMENT — LOCATION DETAILED DESCRIPTION DERM: LOCATION DETAILED: SUPERIOR THORACIC SPINE

## 2022-09-12 ASSESSMENT — LOCATION SIMPLE DESCRIPTION DERM: LOCATION SIMPLE: UPPER BACK

## 2022-10-14 NOTE — PROCEDURE: MOHS SURGERY
Epidermal Closure: running [place] Counseling:  I discussed with the patient the risks of golimumab including but not limited to myelosuppression, immunosuppression, autoimmune hepatitis, demyelinating diseases, lymphoma, and serious infections.  The patient understands that monitoring is required including a PPD at baseline and must alert us or the primary physician if symptoms of infection or other concerning signs are noted.

## 2023-04-26 ENCOUNTER — APPOINTMENT (OUTPATIENT)
Dept: URBAN - METROPOLITAN AREA CLINIC 253 | Age: 78
Setting detail: DERMATOLOGY
End: 2023-04-27

## 2023-04-26 VITALS — HEIGHT: 64 IN | WEIGHT: 154 LBS | RESPIRATION RATE: 14 BRPM

## 2023-04-26 DIAGNOSIS — L57.0 ACTINIC KERATOSIS: ICD-10-CM

## 2023-04-26 DIAGNOSIS — L82.1 OTHER SEBORRHEIC KERATOSIS: ICD-10-CM

## 2023-04-26 DIAGNOSIS — Z71.89 OTHER SPECIFIED COUNSELING: ICD-10-CM

## 2023-04-26 DIAGNOSIS — L81.4 OTHER MELANIN HYPERPIGMENTATION: ICD-10-CM

## 2023-04-26 DIAGNOSIS — D49.2 NEOPLASM OF UNSPECIFIED BEHAVIOR OF BONE, SOFT TISSUE, AND SKIN: ICD-10-CM

## 2023-04-26 DIAGNOSIS — D22 MELANOCYTIC NEVI: ICD-10-CM

## 2023-04-26 DIAGNOSIS — D18.0 HEMANGIOMA: ICD-10-CM

## 2023-04-26 PROBLEM — D22.5 MELANOCYTIC NEVI OF TRUNK: Status: ACTIVE | Noted: 2023-04-26

## 2023-04-26 PROBLEM — D18.01 HEMANGIOMA OF SKIN AND SUBCUTANEOUS TISSUE: Status: ACTIVE | Noted: 2023-04-26

## 2023-04-26 PROCEDURE — OTHER COUNSELING: OTHER

## 2023-04-26 PROCEDURE — 11102 TANGNTL BX SKIN SINGLE LES: CPT

## 2023-04-26 PROCEDURE — 17003 DESTRUCT PREMALG LES 2-14: CPT

## 2023-04-26 PROCEDURE — OTHER BIOPSY BY SHAVE METHOD: OTHER

## 2023-04-26 PROCEDURE — OTHER LIQUID NITROGEN: OTHER

## 2023-04-26 PROCEDURE — 17000 DESTRUCT PREMALG LESION: CPT | Mod: 59

## 2023-04-26 PROCEDURE — OTHER MIPS QUALITY: OTHER

## 2023-04-26 PROCEDURE — 99213 OFFICE O/P EST LOW 20 MIN: CPT | Mod: 25

## 2023-04-26 ASSESSMENT — LOCATION ZONE DERM
LOCATION ZONE: TRUNK
LOCATION ZONE: LEG
LOCATION ZONE: ARM

## 2023-04-26 ASSESSMENT — LOCATION SIMPLE DESCRIPTION DERM
LOCATION SIMPLE: UPPER BACK
LOCATION SIMPLE: LEFT FOREARM
LOCATION SIMPLE: RIGHT CALF

## 2023-04-26 ASSESSMENT — LOCATION DETAILED DESCRIPTION DERM
LOCATION DETAILED: RIGHT DISTAL CALF
LOCATION DETAILED: LEFT PROXIMAL DORSAL FOREARM
LOCATION DETAILED: INFERIOR THORACIC SPINE
LOCATION DETAILED: LEFT DISTAL DORSAL FOREARM

## 2023-04-26 NOTE — PROCEDURE: LIQUID NITROGEN
Number Of Freeze-Thaw Cycles: 3 freeze-thaw cycles
Consent: The patient's consent was obtained including but not limited to risks of crusting, scabbing, blistering, scarring, darker or lighter pigmentary change, recurrence, incomplete removal and infection.
Duration Of Freeze Thaw-Cycle (Seconds): 2
Show Applicator Variable?: Yes
Detail Level: Detailed
Render Note In Bullet Format When Appropriate: No
Post-Care Instructions: I reviewed with the patient in detail post-care instructions. Patient is to wear sunprotection, and avoid picking at any of the treated lesions. Pt may apply Vaseline to crusted or scabbing areas.

## 2023-04-26 NOTE — PROCEDURE: BIOPSY BY SHAVE METHOD
Hide Biopsy Depth?: No
Biopsy Type: H and E
Was A Bandage Applied: Yes
Hemostasis: Drysol
X Size Of Lesion In Cm: 0
Electrodesiccation And Curettage Text: The wound bed was treated with electrodesiccation and curettage after the biopsy was performed.
Notification Instructions: Patient will be notified of biopsy results. However, patient instructed to call the office if not contacted within 2 weeks.
Information: Selecting Yes will display possible errors in your note based on the variables you have selected. This validation is only offered as a suggestion for you. PLEASE NOTE THAT THE VALIDATION TEXT WILL BE REMOVED WHEN YOU FINALIZE YOUR NOTE. IF YOU WANT TO FAX A PRELIMINARY NOTE YOU WILL NEED TO TOGGLE THIS TO 'NO' IF YOU DO NOT WANT IT IN YOUR FAXED NOTE.
Dressing: bandage
Electrodesiccation Text: The wound bed was treated with electrodesiccation after the biopsy was performed.
Billing Type: Third-Party Bill
Depth Of Biopsy: dermis
Anesthesia Volume In Cc (Will Not Render If 0): 0.4
Silver Nitrate Text: The wound bed was treated with silver nitrate after the biopsy was performed.
Cryotherapy Text: The wound bed was treated with cryotherapy after the biopsy was performed.
Type Of Destruction Used: Curettage
Wound Care: Petrolatum
Anesthesia Type: 2% lidocaine with epinephrine and a 1:10 solution of 8.4% sodium bicarbonate
Biopsy Method: Dermablade
Consent: Written consent was obtained and risks were reviewed including but not limited to scarring, infection, bleeding, scabbing, incomplete removal, nerve damage and allergy to anesthesia.
Post-Care Instructions: I reviewed with the patient in detail post-care instructions. Patient is to keep the biopsy site dry overnight, and then apply bacitracin twice daily until healed. Patient may apply hydrogen peroxide soaks to remove any crusting.
Detail Level: Detailed
Curettage Text: The wound bed was treated with curettage after the biopsy was performed.

## 2023-05-10 ENCOUNTER — APPOINTMENT (OUTPATIENT)
Dept: URBAN - METROPOLITAN AREA CLINIC 253 | Age: 78
Setting detail: DERMATOLOGY
End: 2023-05-18

## 2023-05-10 VITALS — HEIGHT: 64 IN | WEIGHT: 148 LBS | RESPIRATION RATE: 14 BRPM

## 2023-05-10 DIAGNOSIS — L57.0 ACTINIC KERATOSIS: ICD-10-CM

## 2023-05-10 PROCEDURE — OTHER COUNSELING: OTHER

## 2023-05-10 PROCEDURE — OTHER LIQUID NITROGEN: OTHER

## 2023-05-10 PROCEDURE — OTHER MIPS QUALITY: OTHER

## 2023-05-10 PROCEDURE — 17000 DESTRUCT PREMALG LESION: CPT

## 2023-05-10 ASSESSMENT — LOCATION SIMPLE DESCRIPTION DERM: LOCATION SIMPLE: LEFT FOREARM

## 2023-05-10 ASSESSMENT — LOCATION ZONE DERM: LOCATION ZONE: ARM

## 2023-05-10 ASSESSMENT — LOCATION DETAILED DESCRIPTION DERM: LOCATION DETAILED: LEFT DISTAL DORSAL FOREARM

## 2023-05-10 NOTE — PROCEDURE: MIPS QUALITY
Quality 431: Preventive Care And Screening: Unhealthy Alcohol Use - Screening: Patient not identified as an unhealthy alcohol user when screened for unhealthy alcohol use using a systematic screening method
Quality 130: Documentation Of Current Medications In The Medical Record: Current Medications Documented
Quality 47: Advance Care Plan: Advance Care Planning discussed and documented; advance care plan or surrogate decision maker documented in the medical record.
Quality 110: Preventive Care And Screening: Influenza Immunization: Influenza Immunization previously received during influenza season
Quality 226: Preventive Care And Screening: Tobacco Use: Screening And Cessation Intervention: Patient screened for tobacco use and is an ex/non-smoker
Quality 111:Pneumonia Vaccination Status For Older Adults: Patient received any pneumococcal conjugate or polysaccharide vaccine on or after their 60th birthday and before the end of the measurement period
Detail Level: Detailed

## 2023-05-10 NOTE — PROCEDURE: LIQUID NITROGEN
Consent: The patient's consent was obtained including but not limited to risks of crusting, scabbing, blistering, scarring, darker or lighter pigmentary change, recurrence, incomplete removal and infection.
Show Applicator Variable?: Yes
Post-Care Instructions: I reviewed with the patient in detail post-care instructions. Patient is to wear sunprotection, and avoid picking at any of the treated lesions. Pt may apply Vaseline to crusted or scabbing areas.
Render Note In Bullet Format When Appropriate: No
Number Of Freeze-Thaw Cycles: 3 freeze-thaw cycles
Duration Of Freeze Thaw-Cycle (Seconds): 2
Detail Level: Detailed

## 2023-05-10 NOTE — HPI: PROCEDURE (LIQUID NITROGEN)
Has Your Condition Been Treated Before?: has not been treated with cryotherapy before
Body Location Override (Optional): Left distal dorsal forearm

## 2024-04-29 ENCOUNTER — APPOINTMENT (OUTPATIENT)
Dept: URBAN - METROPOLITAN AREA CLINIC 253 | Age: 79
Setting detail: DERMATOLOGY
End: 2024-04-30

## 2024-04-29 VITALS — RESPIRATION RATE: 14 BRPM | HEIGHT: 64 IN | WEIGHT: 148 LBS

## 2024-04-29 DIAGNOSIS — L60.8 OTHER NAIL DISORDERS: ICD-10-CM

## 2024-04-29 DIAGNOSIS — D18.0 HEMANGIOMA: ICD-10-CM

## 2024-04-29 DIAGNOSIS — L82.1 OTHER SEBORRHEIC KERATOSIS: ICD-10-CM

## 2024-04-29 DIAGNOSIS — Z71.89 OTHER SPECIFIED COUNSELING: ICD-10-CM

## 2024-04-29 DIAGNOSIS — D22 MELANOCYTIC NEVI: ICD-10-CM

## 2024-04-29 DIAGNOSIS — L81.4 OTHER MELANIN HYPERPIGMENTATION: ICD-10-CM

## 2024-04-29 DIAGNOSIS — L57.0 ACTINIC KERATOSIS: ICD-10-CM

## 2024-04-29 PROBLEM — D18.01 HEMANGIOMA OF SKIN AND SUBCUTANEOUS TISSUE: Status: ACTIVE | Noted: 2024-04-29

## 2024-04-29 PROBLEM — D22.5 MELANOCYTIC NEVI OF TRUNK: Status: ACTIVE | Noted: 2024-04-29

## 2024-04-29 PROBLEM — D48.5 NEOPLASM OF UNCERTAIN BEHAVIOR OF SKIN: Status: ACTIVE | Noted: 2024-04-29

## 2024-04-29 PROCEDURE — OTHER ADDITIONAL NOTES: OTHER

## 2024-04-29 PROCEDURE — 99213 OFFICE O/P EST LOW 20 MIN: CPT | Mod: 25

## 2024-04-29 PROCEDURE — OTHER COUNSELING: OTHER

## 2024-04-29 PROCEDURE — OTHER LIQUID NITROGEN: OTHER

## 2024-04-29 PROCEDURE — 11102 TANGNTL BX SKIN SINGLE LES: CPT

## 2024-04-29 PROCEDURE — OTHER BIOPSY BY SHAVE METHOD: OTHER

## 2024-04-29 PROCEDURE — 17000 DESTRUCT PREMALG LESION: CPT | Mod: 59

## 2024-04-29 PROCEDURE — OTHER MIPS QUALITY: OTHER

## 2024-04-29 ASSESSMENT — LOCATION DETAILED DESCRIPTION DERM
LOCATION DETAILED: RIGHT LATERAL EYEBROW
LOCATION DETAILED: SUPERIOR LUMBAR SPINE
LOCATION DETAILED: INFERIOR THORACIC SPINE
LOCATION DETAILED: RIGHT GREAT TOENAIL
LOCATION DETAILED: LEFT GREAT TOENAIL
LOCATION DETAILED: RIGHT SUPERIOR UPPER BACK

## 2024-04-29 ASSESSMENT — LOCATION ZONE DERM
LOCATION ZONE: TRUNK
LOCATION ZONE: FACE
LOCATION ZONE: TOENAIL

## 2024-04-29 ASSESSMENT — LOCATION SIMPLE DESCRIPTION DERM
LOCATION SIMPLE: UPPER BACK
LOCATION SIMPLE: RIGHT UPPER BACK
LOCATION SIMPLE: RIGHT EYEBROW
LOCATION SIMPLE: RIGHT GREAT TOE
LOCATION SIMPLE: LEFT GREAT TOE
LOCATION SIMPLE: LOWER BACK

## 2024-04-29 NOTE — HPI: FULL BODY SKIN EXAMINATION
What Is The Reason For Today's Visit?: Full Body Skin Examination
What Is The Reason For Today's Visit? (Being Monitored For X): concerning skin lesions on an annual basis
Additional History: She expresses concerns about a lesion on her right eyebrow that she noticed this past winter. She states it is not painful or itchy. She also thinks there may be toenail fungus developing on both feet.

## 2024-04-29 NOTE — PROCEDURE: BIOPSY BY SHAVE METHOD

## 2024-04-29 NOTE — PROCEDURE: ADDITIONAL NOTES
Additional Notes: Informed patient that the lesions can be treated with liquid nitrogen if they become bothersome.
Detail Level: Zone
Render Risk Assessment In Note?: no
Additional Notes: Discussed possibility of these changes occurring secondary to COVID a few months gap. Normal growth at the base 1/3 across toenails nails. No fungal growth present at this time.\\n\\nNo history of rashes or psoriasis.

## 2024-04-29 NOTE — PROCEDURE: LIQUID NITROGEN
Post-Care Instructions: I reviewed with the patient in detail post-care instructions. Patient is to wear sunprotection, and avoid picking at any of the treated lesions. Pt may apply Vaseline to crusted or scabbing areas.
Duration Of Freeze Thaw-Cycle (Seconds): 2
Show Applicator Variable?: Yes
Render Note In Bullet Format When Appropriate: No
Consent: The patient's consent was obtained including but not limited to risks of crusting, scabbing, blistering, scarring, darker or lighter pigmentary change, recurrence, incomplete removal and infection.
Detail Level: Detailed
Application Tool (Optional): Liquid Nitrogen Sprayer
Number Of Freeze-Thaw Cycles: 2 freeze-thaw cycles

## 2024-11-29 NOTE — PROCEDURE: MIPS QUALITY
Quality 431: Preventive Care And Screening: Unhealthy Alcohol Use - Screening: Patient not identified as an unhealthy alcohol user when screened for unhealthy alcohol use using a systematic screening method
Quality 130: Documentation Of Current Medications In The Medical Record: Current Medications Documented
Detail Level: Detailed
Neto Archuleta MD
Quality 226: Preventive Care And Screening: Tobacco Use: Screening And Cessation Intervention: Patient screened for tobacco use and is an ex/non-smoker

## 2025-05-05 ENCOUNTER — APPOINTMENT (OUTPATIENT)
Dept: URBAN - METROPOLITAN AREA CLINIC 253 | Age: 80
Setting detail: DERMATOLOGY
End: 2025-05-05

## 2025-05-05 VITALS — RESPIRATION RATE: 14 BRPM | WEIGHT: 148 LBS | HEIGHT: 64 IN

## 2025-05-05 DIAGNOSIS — L82.1 OTHER SEBORRHEIC KERATOSIS: ICD-10-CM

## 2025-05-05 DIAGNOSIS — D22 MELANOCYTIC NEVI: ICD-10-CM

## 2025-05-05 DIAGNOSIS — L81.4 OTHER MELANIN HYPERPIGMENTATION: ICD-10-CM

## 2025-05-05 DIAGNOSIS — D18.0 HEMANGIOMA: ICD-10-CM

## 2025-05-05 DIAGNOSIS — Z71.89 OTHER SPECIFIED COUNSELING: ICD-10-CM

## 2025-05-05 DIAGNOSIS — D49.2 NEOPLASM OF UNSPECIFIED BEHAVIOR OF BONE, SOFT TISSUE, AND SKIN: ICD-10-CM

## 2025-05-05 DIAGNOSIS — L57.0 ACTINIC KERATOSIS: ICD-10-CM

## 2025-05-05 PROBLEM — D22.5 MELANOCYTIC NEVI OF TRUNK: Status: ACTIVE | Noted: 2025-05-05

## 2025-05-05 PROBLEM — D18.01 HEMANGIOMA OF SKIN AND SUBCUTANEOUS TISSUE: Status: ACTIVE | Noted: 2025-05-05

## 2025-05-05 PROCEDURE — 11102 TANGNTL BX SKIN SINGLE LES: CPT

## 2025-05-05 PROCEDURE — OTHER COUNSELING: OTHER

## 2025-05-05 PROCEDURE — OTHER MIPS QUALITY: OTHER

## 2025-05-05 PROCEDURE — OTHER PHOTO-DOCUMENTATION: OTHER

## 2025-05-05 PROCEDURE — 17003 DESTRUCT PREMALG LES 2-14: CPT

## 2025-05-05 PROCEDURE — 99213 OFFICE O/P EST LOW 20 MIN: CPT | Mod: 25

## 2025-05-05 PROCEDURE — 17000 DESTRUCT PREMALG LESION: CPT | Mod: 59

## 2025-05-05 PROCEDURE — OTHER BIOPSY BY SHAVE METHOD: OTHER

## 2025-05-05 PROCEDURE — OTHER LIQUID NITROGEN: OTHER

## 2025-05-05 ASSESSMENT — LOCATION SIMPLE DESCRIPTION DERM
LOCATION SIMPLE: RIGHT EYEBROW
LOCATION SIMPLE: LEFT UPPER BACK
LOCATION SIMPLE: CHEST
LOCATION SIMPLE: NOSE
LOCATION SIMPLE: LOWER BACK
LOCATION SIMPLE: UPPER BACK

## 2025-05-05 ASSESSMENT — LOCATION ZONE DERM
LOCATION ZONE: FACE
LOCATION ZONE: TRUNK
LOCATION ZONE: NOSE

## 2025-05-05 ASSESSMENT — LOCATION DETAILED DESCRIPTION DERM
LOCATION DETAILED: LEFT SUPERIOR UPPER BACK
LOCATION DETAILED: NASAL DORSUM
LOCATION DETAILED: MIDDLE STERNUM
LOCATION DETAILED: INFERIOR THORACIC SPINE
LOCATION DETAILED: RIGHT LATERAL EYEBROW
LOCATION DETAILED: SUPERIOR LUMBAR SPINE